# Patient Record
Sex: MALE | Race: WHITE | NOT HISPANIC OR LATINO | Employment: OTHER | URBAN - METROPOLITAN AREA
[De-identification: names, ages, dates, MRNs, and addresses within clinical notes are randomized per-mention and may not be internally consistent; named-entity substitution may affect disease eponyms.]

---

## 2019-11-02 ENCOUNTER — HOSPITAL ENCOUNTER (EMERGENCY)
Facility: MEDICAL CENTER | Age: 64
End: 2019-11-02
Attending: EMERGENCY MEDICINE
Payer: MEDICARE

## 2019-11-02 VITALS
WEIGHT: 177.03 LBS | OXYGEN SATURATION: 97 % | DIASTOLIC BLOOD PRESSURE: 77 MMHG | HEART RATE: 84 BPM | BODY MASS INDEX: 24.78 KG/M2 | HEIGHT: 71 IN | TEMPERATURE: 97.5 F | RESPIRATION RATE: 16 BRPM | SYSTOLIC BLOOD PRESSURE: 112 MMHG

## 2019-11-02 DIAGNOSIS — M25.562 ACUTE PAIN OF LEFT KNEE: ICD-10-CM

## 2019-11-02 PROCEDURE — 99283 EMERGENCY DEPT VISIT LOW MDM: CPT

## 2019-11-02 SDOH — HEALTH STABILITY: MENTAL HEALTH: HOW MANY STANDARD DRINKS CONTAINING ALCOHOL DO YOU HAVE ON A TYPICAL DAY?: 1 OR 2

## 2019-11-02 NOTE — ED TRIAGE NOTES
"PT to triage c/o lt knee pain since last night, per report pt squatted and heard a \"snap\" and has had knee pain since  Chief Complaint   Patient presents with   • Knee Pain     lt \"heard a snap\" last night      /77   Pulse 84   Temp 36.4 °C (97.5 °F) (Temporal)   Resp 16   Ht 1.803 m (5' 11\")   Wt 80.3 kg (177 lb 0.5 oz)   SpO2 97%     "

## 2019-11-02 NOTE — ED PROVIDER NOTES
"ED Provider Note    Scribed for Praful Villa M.D. by Chelo Wood. 11/2/2019  10:22 AM    Primary care provider: None   Means of arrival: walk in  History obtained from: patient  History limited by: none    CHIEF COMPLAINT  Chief Complaint   Patient presents with   • Knee Pain     lt \"heard a snap\" last night        HPI  Krystian Jimenez is a 64 y.o. male who presents to the Emergency Department for a left knee injury onset last night. He described that squatted down last night and when he stood up he heard a pop. He has had this happen before with all the same symptoms but the pain usually goes away after a few hours however it didn't resolve. Patient is visiting from [place] and notes he is returning on Monday. Denies numbness or tingling in the leg.    REVIEW OF SYSTEMS  Pertinent positives include left knee pain.   Pertinent negatives include no numbness or tingling .    All other systems reviewed and negative. See HPI for further details.       PAST MEDICAL HISTORY   none pertinent    SURGICAL HISTORY  patient denies any surgical history    SOCIAL HISTORY  Social History     Tobacco Use   • Smoking status: Never Smoker   • Smokeless tobacco: Never Used   Substance Use Topics   • Alcohol use: Yes     Drinks per session: 1 or 2   • Drug use: Never      Social History     Substance and Sexual Activity   Drug Use Never       FAMILY HISTORY  History reviewed. No pertinent family history.    CURRENT MEDICATIONS  Home Medications    **Home medications have not yet been reviewed for this encounter**         ALLERGIES  No Known Allergies    PHYSICAL EXAM  VITAL SIGNS: /77   Pulse 84   Temp 36.4 °C (97.5 °F) (Temporal)   Resp 16   Ht 1.803 m (5' 11\")   Wt 80.3 kg (177 lb 0.5 oz)   SpO2 97%   BMI 24.69 kg/m²     Nursing note and vitals reviewed.  Constitutional: Well-developed and well-nourished. No distress.   HENT: Head is normocephalic and atraumatic. Oropharynx is clear and moist without exudate " or erythema.   Eyes: Pupils are equal, round, and reactive to light. Conjunctiva are normal.   Cardiovascular: Normal rate and regular rhythm. No murmur heard. Normal radial pulses.  Pulmonary/Chest: Breath sounds normal. No wheezes or rales.   Abdominal: Soft and non-tender. No distention    Musculoskeletal: Extremities exhibit normal range of motion without edema. Tenderness along left medial joint line of left knee. No effusion.  Neurological: Awake, alert and oriented to person, place, and time. No focal deficits noted.  Skin: Skin is warm and dry. No rash.   Psychiatric: Normal mood and affect. Appropriate for clinical situation.      COURSE & MEDICAL DECISION MAKING  Nursing notes, VS, PMSFHx reviewed in chart.     10:22 AM - Patient seen and examined at bedside. I suspect that the he has a cartilage injury. Patient declines x-ray and will follow up in [place]. Today we will treat him with a knee immobilizer and crutches.  Advised the patient to ice his knee when moving around a lot and Ibuprofen for pain as needed. Additionally I recommended the patient stay off of his knee as much as possible and elevate it when siting or laying down. Patient will be discharged home. He verbalizes agreement to discharge and plan of care. The differential diagnoses include but are not limited to: cartilage injury.    The patient will return for new or worsening symptoms and is stable at the time of discharge.    DISPOSITION:  Patient will be discharged home in stable condition.    FOLLOW UP:  [facility], Emergency Dept  [facility] [id number] [phone]  Schedule an appointment as soon as possible for a visit       FINAL IMPRESSION  1. Acute pain of left knee          Chelo MUSTAFA), more scribing for, and in the presence of, Praful Villa M.D..    Electronically signed by: Chelo Acuña), 11/2/2019    Praful MUSTAFA M.D. personally performed the services  described in this documentation, as scribed by Chelo Wood in my presence, and it is both accurate and complete. E    The note accurately reflects work and decisions made by me.  Praful Villa  11/2/2019  11:15 AM

## 2020-01-22 ENCOUNTER — TRANSFERRED RECORDS (OUTPATIENT)
Dept: HEALTH INFORMATION MANAGEMENT | Facility: CLINIC | Age: 65
End: 2020-01-22

## 2020-07-22 ENCOUNTER — TRANSFERRED RECORDS (OUTPATIENT)
Dept: HEALTH INFORMATION MANAGEMENT | Facility: CLINIC | Age: 65
End: 2020-07-22

## 2020-07-31 ENCOUNTER — TELEPHONE (OUTPATIENT)
Dept: OPHTHALMOLOGY | Facility: CLINIC | Age: 65
End: 2020-07-31

## 2020-07-31 PROBLEM — H35.3132 INTERMEDIATE STAGE DRY AGE-RELATED MACULAR DEGENERATION OF BOTH EYES: Status: ACTIVE | Noted: 2020-07-31

## 2020-07-31 NOTE — TELEPHONE ENCOUNTER
Left voice message to schedule appointment per Dr. Quintero. Per notes, patient should see Dr. Mcnally to rule out inflammation.   Shine Meneses @ [facility] 9:27 AM July 31, 2020

## 2020-08-03 ENCOUNTER — OFFICE VISIT (OUTPATIENT)
Dept: OPHTHALMOLOGY | Facility: CLINIC | Age: 65
End: 2020-08-03
Attending: OPHTHALMOLOGY
Payer: MEDICARE

## 2020-08-03 DIAGNOSIS — H30.92 POSTERIOR UVEITIS, LEFT EYE: Primary | ICD-10-CM

## 2020-08-03 DIAGNOSIS — H35.352 CYSTOID MACULAR EDEMA, LEFT EYE: ICD-10-CM

## 2020-08-03 DIAGNOSIS — H35.3132 INTERMEDIATE STAGE DRY AGE-RELATED MACULAR DEGENERATION OF BOTH EYES: ICD-10-CM

## 2020-08-03 DIAGNOSIS — Z79.899 HIGH RISK MEDICATION USE: ICD-10-CM

## 2020-08-03 DIAGNOSIS — H40.052 OCULAR HYPERTENSION, LEFT EYE: ICD-10-CM

## 2020-08-03 PROCEDURE — 92242 FLUORESCEIN&ICG ANGIOGRAPHY: CPT | Mod: ZF | Performed by: OPHTHALMOLOGY

## 2020-08-03 PROCEDURE — 92134 CPTRZ OPH DX IMG PST SGM RTA: CPT | Mod: ZF | Performed by: OPHTHALMOLOGY

## 2020-08-03 PROCEDURE — G0463 HOSPITAL OUTPT CLINIC VISIT: HCPCS | Mod: 25

## 2020-08-03 RX ORDER — SIMVASTATIN 20 MG
20 TABLET ORAL DAILY
COMMUNITY
Start: 2019-10-18

## 2020-08-03 RX ORDER — DORZOLAMIDE HYDROCHLORIDE AND TIMOLOL MALEATE 20; 5 MG/ML; MG/ML
1 SOLUTION/ DROPS OPHTHALMIC 2 TIMES DAILY
COMMUNITY
Start: 2019-11-06 | End: 2020-09-28

## 2020-08-03 RX ORDER — LISINOPRIL/HYDROCHLOROTHIAZIDE 10-12.5 MG
1 TABLET ORAL DAILY
COMMUNITY
Start: 2019-10-18

## 2020-08-03 RX ORDER — TAMSULOSIN HYDROCHLORIDE 0.4 MG/1
0.4 CAPSULE ORAL DAILY
COMMUNITY
Start: 2020-07-16 | End: 2021-07-16

## 2020-08-03 ASSESSMENT — REFRACTION_WEARINGRX
OS_ADD: +2.25
OD_SPHERE: -5.50
OD_CYLINDER: +2.50
OS_CYLINDER: +1.75
OS_SPHERE: -6.25
SPECS_TYPE: PAL
OS_AXIS: 100
OD_ADD: +2.25
OD_AXIS: 001

## 2020-08-03 ASSESSMENT — TONOMETRY
OS_IOP_MMHG: 16
IOP_METHOD: TONOPEN
OD_IOP_MMHG: 17

## 2020-08-03 ASSESSMENT — EXTERNAL EXAM - RIGHT EYE: OD_EXAM: NORMAL

## 2020-08-03 ASSESSMENT — VISUAL ACUITY
METHOD: SNELLEN - LINEAR
CORRECTION_TYPE: GLASSES
OS_CC: 20/25
OD_CC: 20/15
OS_CC+: -2
OD_CC+: -1

## 2020-08-03 ASSESSMENT — CONF VISUAL FIELD
OS_NORMAL: 1
OD_NORMAL: 1

## 2020-08-03 ASSESSMENT — SLIT LAMP EXAM - LIDS
COMMENTS: MILD MGD
COMMENTS: MILD MGD

## 2020-08-03 ASSESSMENT — CUP TO DISC RATIO
OS_RATIO: 0.7
OD_RATIO: 0.5

## 2020-08-03 ASSESSMENT — EXTERNAL EXAM - LEFT EYE: OS_EXAM: NORMAL

## 2020-08-03 NOTE — LETTER
8/3/2020       RE: Lc Hernandez  [id number] [facility] [id number]     Dear Colleague,    Thank you for referring your patient, Lc Hernandez, to the EYE CLINIC at [facility]. Please see a copy of my visit note below.    Chief Complaint/Presenting Concern: Uveitis evaluation    History of Present Illness:   Lc Hernandez is a 65 year old patient who presents for evaluation of from Dr. Ankur Quintero.    History began around November 2019 at which time he saw an outside Optometrist who noted changes in the left eye and then referred the patient to Dr. Ankur Quintero. 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  Laboratory work-up at that time was reported as negative and include HLA-A 29.  The patient was started on oral prednisone 80 mg daily and had improvement in the uveitis as well as cystoid macular edema and this was tapered gradually and then tapered off by February 2020.    Mr. Hernandez saw a rheumatologist in March 2020 about steroid sparing therapy. There were discussions about possible Methotrexate and Mycophenolate but none were needed. Mr. Hernandez had an MRI brain May 2020 which did not show any signs of CNS lymphoma.    In July 2020 the patient presented to Dr. Ankur Quintero with new hyper autofluorescent spots nasally in the left eye while off oral prednisone and this consultation was recommended.    Overall, he does not feel things are very different in the left eye. He feels they are some blind spots in the left eye, but these don't seem to interfere with day to day life. No flashing lights or floaters. He does notice any changes in the right eye.    Additional Ocular History:   1.  Intermediate nonexudative macular degeneration of both eyes  2.  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  Also followed by Dr. Chuck Wade. Improved on drops, not steroid related.     Relevant Past Medical/Family/Social  History:  1. Hypertension  2. Hyperlipidemia  3. History of Prostate cancer treated in 2019. On Flomax    Numerous surgeries including hernia surgery in 2010, knee surgery in 2017, back surgery as well. Mother and maternal uncle with macular degeneration.    Relevant Review of Systems: No fevers, rashes, joint pains, no confusion or memory issues.     Laboratory Testing (reviewed)  1.July 2020: CBC within normal limits other than slight reduction of absolute neutrophil count (0.4)  2. March 2020: Serum protein electrophoresis normal.   3. November 2019: Treponemal antibodies negative, QuantiFERON gold negative, CBC with absolute neutrophil count slightly reduced at 0.4     Current eye related medications: AREDS 2 vitamins, Cosopt 2x/day left eye     Retina/Uveitis Imaging:  OCT Spectralis Macula August 3, 2020  right eye: Pseudodrusen, drusen, no fluid. Normal to slightly thin choroid.  left eye: ERM, no fluid. Pseudodrusen, central drusenoid PED, thin choroid.     Optos Fluorescein and ICG Angiography OU (both eyes) August 3, 2020   right eye: Hyper-fluroescent spots in nasal and temporal. Mild hyperfluorescent staining along periphery temporal, no abnormal ICG spots. Late disc staining. Mild hyperfluorescence of drusen in macula, no CME  left eye: Normal transit. Mild tortuosity of vessels in macula. No significant large or small leakage nor CME. Numerous nasal, superior, inferior hyper-fluorescent spots which have corresponding hypo-ICG spots.     Optos Photos and AF  right eye: Drusen, nasal mottling. Near normal AF  left eye: Drusen, nasal hypo-pigmented lesions numerous, some superior and inferior mid periphery which are Hyper-AF    Assessment:    1. Posterior uveitis, left eye  With choroidal lesions and previous noted to have cystoid macular edema and disc edema. There are hypo-pigmented spots in the left eye with corresponding hyper-fluorescent spots     2. Cystoid macular edema, left eye  None by OCT  today.     3. Intermediate stage dry age-related macular degeneration of both eyes  Mild and non-exudative in each eye.     4. Ocular hypertension, left eye  IOP 16 on Cosopt 2x/day. Per patient, likely similar VF testing.    5. High risk medication use  Prednisone previously tolerated up to 80 mg daily.     Plan/Recommendations:    Discussed findings with patient. Agree with Dr. 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  Other diagnostic possibilities include primary intraocular lymphoma or metastatic lesions.  However given the normal brain MRI in May 2020 and well treated and monitored prostate cancer, both of these seem less likely.    Another possibility is localized choroidal lymphoma.  This is distinct from systemic lymphoma as it is present only in the eye and tends to be stable without progression.  This can often be seen in patients who have such findings. Some differences include a lack of progression over time and no significant associated inflammatory changes.  As this patient did have cystoid macular edema and optic disc edema associated with these lesions, this condition is also in the differential diagnosis but also seems less likely    Recommend ACE, ANCA to complete work up for possible granulomatous changes in the choroid of the left eye.  There are no systemic symptoms to suggest Sarcoidosis nor GPA, but this would complete the work-up.     Continue Cosopt 2x/day in the left eye and AREDS 2 vitamins    Would not recommend oral prednisone nor initiating immunosuppression at this time given the absence of systemic inflammatory features.  Would recommend close monitoring with OCT and fundus autofluorescence, and will request visual field testing.  If there is progression of the autofluorescence with new spots, progressive visual field changes, cystoid macular edema, a course of oral prednisone may be recommended followed by bridge to steroid sparing  therapy which is usually mycophenolate in this condition.    Another consideration might be a full-field electroretinogram (ERG).  However as this assesses both eyes, it may be less helpful as the right eye appears normal.  This may be a consideration to help monitor in the future    RTC   1. Return for 4-6 weeks, IOP, Dilate, OCT (ordered), Optos Photos, AF.    2. Request VF testing from Dr. Quintero (will send copies of FA/OCG images)    Physician Attestation     Attending Physician Attestation:  Complete documentation of historical and exam elements from today's encounter can be found in the full encounter summary report (not reduplicated in this progress note). I personally obtained the chief complaint(s) and history of present illness. I confirmed and edited as necessary the review of systems, past medical/surgical history, family history, social history, and examination findings as documented by others; and I examined the patient myself. I personally reviewed the relevant tests, images, and reports as documented above. I formulated and edited as necessary the assessment and plan and discussed the findings and management plan with the patient and any family members present at the time of the visit.  Nba Mcnally M.D., Uveitis and Medical Retina, August 3, 2020     Sincerely,    Nba Mcnally MD  [facility] Dept of Ophthalmology  Uveitis and Medical Retina

## 2020-08-03 NOTE — NURSING NOTE
Chief Complaints and History of Present Illnesses   Patient presents with     Consult For     Chief Complaint(s) and History of Present Illness(es)     Consult For     Laterality: left eye    Associated symptoms: Negative for flashes, floaters and eye pain (does state the eyes are a little irritated from the mask)    Pain scale: 0/10              Comments     Referred by Dr. Quintero for possible birdshot chorioretinopathy of the left eye  Pt had not noted any changes in vision    Ocular meds:  Cosopt BID, left eye    DENNY Santos 9:05 AM August 3, 2020

## 2020-08-03 NOTE — PROGRESS NOTES
Chief Complaint/Presenting Concern: Uveitis evaluation    History of Present Illness:   Lc Hernandez is a 65 year old patient who presents for evaluation of from Dr. Ankur Quintero.    History began around November 2019 at which time he saw an outside Optometrist who noted changes in the left eye and then referred the patient to Dr. Anukr Quintero. 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  Laboratory work-up at that time was reported as negative and include HLA-A 29.  The patient was started on oral prednisone 80 mg daily and had improvement in the uveitis as well as cystoid macular edema and this was tapered gradually and then tapered off by February 2020.    Mr. Hernandez saw a Rheumatologist in March 2020 about steroid sparing therapy. There were discussions about possible Methotrexate and Mycophenolate but none were needed. Mr. Hernandez had an MRI brain May 2020 which did not show any signs of CNS lymphoma.    In July 2020 the patient presented to Dr. Ankur Quintero with new hyper autofluorescent spots nasally in the left eye while off oral prednisone and this consultation was recommended.    Overall, he does not feel things are very different in the left eye. He feels they are some blind spots in the left eye, but these don't seem to interfere with day to day life. No flashing lights or floaters. He does notice any changes in the right eye.    Additional Ocular History:   1.  Intermediate nonexudative macular degeneration of both eyes  2.  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  Also followed by Dr. Chuck Wade. Improved on drops, not steroid related.     Relevant Past Medical/Family/Social History:  1. Hypertension  2. Hyperlipidemia  3. History of Prostate cancer treated in 2019. On Flomax    Numerous surgeries including hernia surgery in 2010, knee surgery in 2017, back surgery as well. Mother and maternal uncle with macular degeneration.    Relevant  Review of Systems: No fevers, rashes, joint pains, no confusion or memory issues.     Laboratory Testing (reviewed)  1.July 2020: CBC within normal limits other than slight reduction of absolute neutrophil count (0.4)  2. March 2020: Serum protein electrophoresis normal.   3. November 2019: Treponemal antibodies negative, QuantiFERON gold negative, CBC with absolute neutrophil count slightly reduced at 0.4     Current eye related medications: AREDS 2 vitamins, Cosopt 2x/day left eye     Retina/Uveitis Imaging:  OCT Spectralis Macula August 3, 2020  right eye: Pseudodrusen, drusen, no fluid. Normal to slightly thin choroid.  left eye: ERM, no fluid. Pseudodrusen, central drusenoid PED, thin choroid.     Optos Fluorescein and ICG Angiography OU (both eyes) August 3, 2020   right eye: Hyper-fluroescent spots in nasal and temporal. Mild hyperfluorescent staining along periphery temporal, no abnormal ICG spots. Late disc staining. Mild hyperfluorescence of drusen in macula, no CME  left eye: Normal transit. Mild tortuosity of vessels in macula. No significant large or small leakage nor CME. Numerous nasal, superior, inferior hyper-fluorescent spots which have corresponding hypo-ICG spots.     Optos Photos and AF  right eye: Drusen, nasal mottling. Near normal AF  left eye: Drusen, nasal hypo-pigmented lesions numerous, some superior and inferior mid periphery which are Hyper-AF    Assessment:    1. Posterior uveitis, left eye  With choroidal lesions and previous noted to have cystoid macular edema and disc edema. There are hypo-pigmented spots in the left eye with corresponding hyper-fluorescent spots     2. Cystoid macular edema, left eye  None by OCT today.     3. Intermediate stage dry age-related macular degeneration of both eyes  Mild and non-exudative in each eye.     4. Ocular hypertension, left eye  IOP 16 on Cosopt 2x/day. Per patient, likely similar VF testing.    5. High risk medication use  Prednisone  previously tolerated up to 80 mg daily.     Plan/Recommendations:    Discussed findings with patient. Agree with Dr. 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  Other diagnostic possibilities include primary intraocular lymphoma or metastatic lesions.  However given the normal brain MRI in May 2020 and well treated and monitored prostate cancer, both of these seem less likely.    Another possibility is localized choroidal lymphoma.  This is distinct from systemic lymphoma as it is present only in the eye and tends to be stable without progression.  This can often be seen in patients who have such findings. Some differences include a lack of progression over time and no significant associated inflammatory changes.  As this patient did have cystoid macular edema and optic disc edema associated with these lesions, this condition is also in the differential diagnosis but also seems less likely    Recommend ACE, ANCA to complete work up for possible granulomatous changes in the choroid of the left eye.  There are no systemic symptoms to suggest Sarcoidosis nor GPA, but this would complete the work-up.     Continue Cosopt 2x/day in the left eye and AREDS 2 vitamins    Would not recommend oral prednisone nor initiating immunosuppression at this time given the absence of systemic inflammatory features.  Would recommend close monitoring with OCT and fundus autofluorescence, and will request visual field testing.  If there is progression of the autofluorescence with new spots, progressive visual field changes, cystoid macular edema, a course of oral prednisone may be recommended followed by bridge to steroid sparing therapy which is usually mycophenolate in this condition.    Another consideration might be a full-field electroretinogram (ERG).  However as this assesses both eyes, it may be less helpful as the right eye appears normal.  This may be a consideration to help monitor in  the future    RTC   1. Return for 4-6 weeks, IOP, Dilate, OCT (ordered), Optos Photos, AF.    2. Request VF testing from Dr. Quintero (will send copies of FA/OCG images)    Update August 25, 2020: Called patient with results. ANCA negative, ACE low (on lisinopril). No other testing recommended. As no change in symptoms, would not recommend any other treatment besides Cosopt twice a day in the left eye and AREDS vitamins.  Recommend returning in a few weeks as scheduled      Physician Attestation     Attending Physician Attestation:  Complete documentation of historical and exam elements from today's encounter can be found in the full encounter summary report (not reduplicated in this progress note). I personally obtained the chief complaint(s) and history of present illness. I confirmed and edited as necessary the review of systems, past medical/surgical history, family history, social history, and examination findings as documented by others; and I examined the patient myself. I personally reviewed the relevant tests, images, and reports as documented above. I formulated and edited as necessary the assessment and plan and discussed the findings and management plan with the patient and any family members present at the time of the visit.  Nba Mcnally M.D., Uveitis and Medical Retina, August 3, 2020

## 2020-08-21 ENCOUNTER — TRANSFERRED RECORDS (OUTPATIENT)
Dept: HEALTH INFORMATION MANAGEMENT | Facility: CLINIC | Age: 65
End: 2020-08-21

## 2020-09-14 ENCOUNTER — OFFICE VISIT (OUTPATIENT)
Dept: OPHTHALMOLOGY | Facility: CLINIC | Age: 65
End: 2020-09-14
Attending: OPHTHALMOLOGY
Payer: MEDICARE

## 2020-09-14 DIAGNOSIS — H40.052 OCULAR HYPERTENSION, LEFT EYE: ICD-10-CM

## 2020-09-14 DIAGNOSIS — H30.92 POSTERIOR UVEITIS, LEFT EYE: Primary | ICD-10-CM

## 2020-09-14 DIAGNOSIS — H35.052 CHOROIDAL RETINAL NEOVASCULARIZATION OF LEFT EYE: ICD-10-CM

## 2020-09-14 DIAGNOSIS — Z79.899 HIGH RISK MEDICATION USE: ICD-10-CM

## 2020-09-14 DIAGNOSIS — H35.3132 INTERMEDIATE STAGE DRY AGE-RELATED MACULAR DEGENERATION OF BOTH EYES: ICD-10-CM

## 2020-09-14 DIAGNOSIS — H35.352 CYSTOID MACULAR EDEMA, LEFT EYE: ICD-10-CM

## 2020-09-14 PROCEDURE — G0463 HOSPITAL OUTPT CLINIC VISIT: HCPCS | Mod: ZF

## 2020-09-14 PROCEDURE — 92134 CPTRZ OPH DX IMG PST SGM RTA: CPT | Mod: ZF | Performed by: OPHTHALMOLOGY

## 2020-09-14 PROCEDURE — 92250 FUNDUS PHOTOGRAPHY W/I&R: CPT | Mod: 59 | Performed by: OPHTHALMOLOGY

## 2020-09-14 RX ORDER — PREDNISONE 10 MG/1
TABLET ORAL
Qty: 30 TABLET | Refills: 1 | Status: SHIPPED | OUTPATIENT
Start: 2020-09-14 | End: 2020-09-28

## 2020-09-14 ASSESSMENT — CONF VISUAL FIELD
OS_NORMAL: 1
OD_NORMAL: 1
METHOD: COUNTING FINGERS

## 2020-09-14 ASSESSMENT — VISUAL ACUITY
OS_CC+: -2
CORRECTION_TYPE: GLASSES
OD_CC: 20/20
OD_CC+: -2
OS_CC: 20/25
METHOD: SNELLEN - LINEAR

## 2020-09-14 ASSESSMENT — REFRACTION_WEARINGRX
OD_CYLINDER: +2.50
OS_SPHERE: -6.25
SPECS_TYPE: PAL
OS_ADD: +2.25
OS_CYLINDER: +1.75
OD_AXIS: 001
OS_AXIS: 100
OD_ADD: +2.25
OD_SPHERE: -5.50

## 2020-09-14 ASSESSMENT — EXTERNAL EXAM - LEFT EYE: OS_EXAM: NORMAL

## 2020-09-14 ASSESSMENT — CUP TO DISC RATIO
OD_RATIO: 0.4
OS_RATIO: 0.8

## 2020-09-14 ASSESSMENT — TONOMETRY
OD_IOP_MMHG: 17
IOP_METHOD: TONOPEN
OS_IOP_MMHG: 13

## 2020-09-14 ASSESSMENT — SLIT LAMP EXAM - LIDS
COMMENTS: MILD MGD
COMMENTS: MILD MGD

## 2020-09-14 ASSESSMENT — EXTERNAL EXAM - RIGHT EYE: OD_EXAM: NORMAL

## 2020-09-14 NOTE — PATIENT INSTRUCTIONS
Continue Cosopt 2x/day left eye and AREDS Vitamins    Restart prednisone 20 mg daily. Here is some information about prednisone . We will see you back in 1-2 weeks. Check in the lines on the chart in the left eye and if things get blurrier/wavier, please call us for an appointment sooner.     Prednisone is a steroid pill. It can be used for many different conditions and usually for short periods of time (a few weeks to a few months), but some people may take it for years. Sometimes we might get blood tests before starting this medication, but not always.    Prednisone is often prescribed as 10 mg pills. It is usually easiest to take the whole dose (all the pills) with breakfast because it can minimize side effects. A very common side effect is stomach discomfort and some people take antacid medications to help with these symptoms. Other side effects which can occur include raising the blood pressure, blood sugar, and weight. It can also cause irritability and trouble sleeping. For these reasons, we will try to limit the amount of time that prednisone is taken.    Bone loss is one of the long term side effects which can occur on prednisone. If you have been on this medication for many months or many years, you should talk to your Primary Doctor about a Bone Density (DEXA) scan.

## 2020-09-14 NOTE — NURSING NOTE
Chief Complaints and History of Present Illnesses   Patient presents with     Follow Up     6 week follow up Posterior uveitis, left eye     Chief Complaint(s) and History of Present Illness(es)     Follow Up     Laterality: both eyes    Comments: 6 week follow up Posterior uveitis, left eye              Comments     Pt states vision is the same or better since last visit. No eye pain today.  No flashes or floaters No redness or dryness.    BRE Lozano September 14, 2020 10:40 AM

## 2020-09-14 NOTE — PROGRESS NOTES
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 Since last visit, he had lab testing which was negative for ACE and ANCA. He has not needed to restart prednisone and has not noted any worsening since then. No flashes, spots or other changes. The right eye is also asymptomatic     Interval Updates to Medical/Family/Social History:  No other health changes since last visit    Relevant Review of Systems Updates:  No recent illnesses    Laboratory Testing: ANCA negative, ACE low (on lisinopril).      Current eye related medications: Cosopt 2x/day left eye, AREDS Vitamins    Retina/Uveitis Imaging:   OCT Spectralis Macula September 14, 2020  right eye: Drusen, no fluid. Thin choroid. Stable.   left eye: Larger serous PED inferior and superior to fovea, but no bonifacio SRF or IRF. Normal choroidal thickness.    OCT-A with subtle abnormal flow signal in avascular outer retina left eye and irregular choriocapillaris flow left eye     Optos AF September 14, 2020  right eye Generally normal AF  left eye: Stable hyper-AF discrete rounds spots mostly superior, nasal, inferior     Optos Photos September 14, 2020  right eye: Mottling in macula  left eye: Cupping, Focal hypo-pigmented spots nasal, superior, inferior. Stable    Assessment:     1. Posterior uveitis, left eye  Exam unchanged. Optos Photos/Autofluorescence unchanged but new vitreous inflammation and macular changes by OCT as noted below.     2. Cystoid macular edema, left eye  No bonifacio intraretinal fluid but new pigment epithelial detachment with normal choroidal thickness    3. Intermediate stage dry age-related macular degeneration of both eyes  Mottling in each eye     4. Choroidal retinal neovascularization of left eye  New finding today with flow signal in avascular outer retina and choroid capillaritis of  the left eye    5. Ocular hypertension, left eye  Controlled on Cosopt 2x/day.    6. High risk medication use  Now off prednisone for the last 5 months    Plan/Recommendations:      Discussed findings with patient. Increased inflammation left eye with larger PED on OCT scan.  We discussed that these could be an early inflammatory CNV (less likely seen in [place] Chorioretinopathy) or manifestation of inflammatory cystoid macular edema (more common on [place])    OCT-A left eye showed abnormal flow signal in avascular outer retina and choriocapillaris, which supports achoroidal neovascular process in the left eye.  This could be secondary to inflammation or could be secondary to aging related changes (macular degeneration)    Do not recommend additiional diagnostic testing at this time    Continue Cosopt 2x/day left eye and AREDS Vitamins    We discussed a therapeutic trial of moderate dose of oral prednisone to determine if these macular changes in the left eye would improve as there is an increase in vitreous inflammation today.  We also discussed the possibility of an antibiotic injection called Avastin in the left eye.  We elected to proceed with a course of oral prednisone and reevaluate soon.    Restart prednisone 20 mg daily. If not better in 1-2 week, this might be secondary to AMD and not uveitis. If so, would recommend Avastin in the left eye.  This was discussed with the patient today and ordered in the event that it would be needed next time    RTC  1. Return 1-2 weeks IOP, dilate, OCT (ordered). Possible Avastin left eye    2. Dr. Eduardo Wade to follow up on Glaucoma later    Physician Attestation     Attending Physician Attestation:  Complete documentation of historical and exam elements from today's encounter can be found in the full encounter summary report (not reduplicated in this progress note). I personally obtained the chief complaint(s) and history of present illness. I confirmed and edited  as necessary the review of systems, past medical/surgical history, family history, social history, and examination findings as documented by others; and I examined the patient myself. I personally reviewed the relevant tests, images, and reports as documented above. I formulated and edited as necessary the assessment and plan and discussed the findings and management plan with the patient and family members present at the time of this visit.  Nba Mcnally M.D., Uveitis and Medical Retina, September 14, 2020

## 2020-09-28 ENCOUNTER — OFFICE VISIT (OUTPATIENT)
Dept: OPHTHALMOLOGY | Facility: CLINIC | Age: 65
End: 2020-09-28
Attending: OPHTHALMOLOGY
Payer: MEDICARE

## 2020-09-28 DIAGNOSIS — H40.052 OCULAR HYPERTENSION, LEFT EYE: ICD-10-CM

## 2020-09-28 DIAGNOSIS — H35.352 CYSTOID MACULAR EDEMA, LEFT EYE: ICD-10-CM

## 2020-09-28 DIAGNOSIS — H35.3132 INTERMEDIATE STAGE DRY AGE-RELATED MACULAR DEGENERATION OF BOTH EYES: ICD-10-CM

## 2020-09-28 DIAGNOSIS — Z79.899 HIGH RISK MEDICATION USE: ICD-10-CM

## 2020-09-28 DIAGNOSIS — H30.92 POSTERIOR UVEITIS, LEFT EYE: Primary | ICD-10-CM

## 2020-09-28 DIAGNOSIS — H35.052 CHOROIDAL RETINAL NEOVASCULARIZATION OF LEFT EYE: ICD-10-CM

## 2020-09-28 PROCEDURE — G0463 HOSPITAL OUTPT CLINIC VISIT: HCPCS | Mod: ZF

## 2020-09-28 PROCEDURE — 92134 CPTRZ OPH DX IMG PST SGM RTA: CPT | Mod: ZF | Performed by: OPHTHALMOLOGY

## 2020-09-28 RX ORDER — DORZOLAMIDE HYDROCHLORIDE AND TIMOLOL MALEATE 20; 5 MG/ML; MG/ML
1 SOLUTION/ DROPS OPHTHALMIC 2 TIMES DAILY
Qty: 10 ML | Refills: 6 | Status: SHIPPED | OUTPATIENT
Start: 2020-09-28 | End: 2021-01-27

## 2020-09-28 RX ORDER — PREDNISONE 10 MG/1
TABLET ORAL
Qty: 2 TABLET | Refills: 0
Start: 2020-09-28 | End: 2020-10-28

## 2020-09-28 RX ORDER — DIAZEPAM 5 MG
5 TABLET ORAL ONCE
Qty: 1 TABLET | Refills: 0 | Status: SHIPPED | OUTPATIENT
Start: 2020-09-28 | End: 2020-09-28

## 2020-09-28 ASSESSMENT — CUP TO DISC RATIO
OS_RATIO: 0.8
OD_RATIO: 0.4

## 2020-09-28 ASSESSMENT — REFRACTION_WEARINGRX
OD_SPHERE: -5.50
OS_ADD: +2.25
OD_ADD: +2.25
OS_SPHERE: -6.25
OS_AXIS: 100
SPECS_TYPE: PAL
OD_AXIS: 001
OD_CYLINDER: +2.50
OS_CYLINDER: +1.75

## 2020-09-28 ASSESSMENT — CONF VISUAL FIELD
OS_NORMAL: 1
METHOD: COUNTING FINGERS
OD_NORMAL: 1

## 2020-09-28 ASSESSMENT — VISUAL ACUITY
OS_PH_CC: 20/30
METHOD: SNELLEN - LINEAR
OS_CC: 20/40
OD_CC: 20/20
CORRECTION_TYPE: GLASSES
OS_CC+: +1

## 2020-09-28 ASSESSMENT — EXTERNAL EXAM - RIGHT EYE: OD_EXAM: NORMAL

## 2020-09-28 ASSESSMENT — SLIT LAMP EXAM - LIDS
COMMENTS: MILD MGD
COMMENTS: MILD MGD

## 2020-09-28 ASSESSMENT — TONOMETRY
OS_IOP_MMHG: 17
IOP_METHOD: TONOPEN
OD_IOP_MMHG: 17

## 2020-09-28 ASSESSMENT — EXTERNAL EXAM - LEFT EYE: OS_EXAM: NORMAL

## 2020-09-28 NOTE — NURSING NOTE
Chief Complaints and History of Present Illnesses   Patient presents with     Uveitis Follow-Up     Chief Complaint(s) and History of Present Illness(es)     Uveitis Follow-Up     Laterality: left eye    Onset: weeks ago    Quality: States va is the same since last visit      Associated symptoms: Negative for flashes, floaters and photophobia    Pain scale: 0/10              Comments     Here for Posterior uveitis, left eye   Prednisone 20 mg daily will discontinue today  Dorz/Timolol BID CARLY Zapata COT 10:00 AM September 28, 2020

## 2020-09-28 NOTE — PATIENT INSTRUCTIONS
Continue Cosopt 2x/day in the left eye. Let's continue this until 1 week before the next visit    Continue the eye vitamins    Reduce the prednisone to one pill 10 mg daily for the next two days then stop    Monitor the vision daily in the left eye at least daily. If there any changes, please call us for an appointment that day or the next day. You could also see one of the Retina Doctors there, Dr. Mera Reyes.     When you come for the next visit,  the valium prescription to be used if we need to do an injection of Avastin.

## 2020-09-28 NOTE — LETTER
9/28/2020       RE: Lc Hernandez  [id number] [facility] [id number]     Dear Colleague,    Thank you for referring your patient, Lc Hernandez, to the EYE CLINIC at [facility]. Please see a copy of my visit note below.    Chief Complaint/Presenting Concern:  Uveitis follow up    Interval History of Present Ocular Illness: Lc Hernandez is a 65 year old patient who returns for follow up of the posterior uveitis and CME/CNV of the left eye. At last visit, we identified a change on the OCT scan of the left eye and discussed Avastin vs prednisone (for possible inflammatory CNV) and elected to start prednisone. In the interim, no changes per Mr. Hernandez. Prednisone 20 mg daily well tolerated.     Interval Updates to Medical/Family/Social History:  No new medications    Relevant Review of Systems Updates: No recent health changes, no issues with predinsone    Laboratory Testing: None     Current eye related medications: Cosopt 2x/day left eye, AREDS Vitamins, Prednisone 20 mg daily.    Retina/Uveitis Imaging:   OCT Spectralis Macula September 28, 2020  right eye: Drusen, no fluid. Stable  left eye: Bi-lobed PEDs which are both increased in size    Assessment:   1. Posterior uveitis, left eye  Improved inflammation on prednisone.    2. Cystoid macular edema, left eye  No true CME    3. Choroidal retinal neovascularization of left eye  Slightly worse by OCT    4. Intermediate stage dry age-related macular degeneration of both eyes  Right eye stable, left eye as above    5. Ocular hypertension, left eye  On Cosopt 2x/day with cupping in the left eye     6. High risk medication use  Prednisone 20 mg daily    Plan/Recommendations:      Discussed findings with patient. The inflammation is better but the PED worse.  This suggests that this is not an inflammatory choroidal neovascular process in the left eye but may be related more to the underlying macular degeneration.  We discussed  the Avastin injection in the left to help stabilize the process but that likely this will need to be repeated on a monthly basis and then the interval gradually extended, but monitored indefinitely.  We discussed the patient's health and his wife's health and the need for such routine follow-up may be difficult.      We discussed that the worse case scenario would include enlargement of the blurry spot in the left eye, but that this should not affect the peripheral vision.  Given the scenario including some hesitation about eye injections, we elected to monitor at home with Amsler grid and return in 2 months or sooner if vision changes are identified    Continue Cosopt 2x/day in the left eye. Let's continue this until 1 week before the next visit    Continue the eye vitamins.  We will plan on obtaining an optic nerve scan in the left eye at the next visit to determine if long-term use of Cosopt is necessary    Reduce the prednisone to one pill 10 mg daily for the next two days then stop    Monitor the vision daily in the left eye at least daily. If there any changes, please call us for an appointment that day or the next day. You could also see one of the Retina Doctors there, Dr. Mera Reyes.     When you come for the next visit,  the valium prescription to be used if we need to do an injection of Avastin.     RTC 2 months IOP, dilate, OCT, RNFL (both ordered)     Attending Physician Attestation:  Complete documentation of historical and exam elements from today's encounter can be found in the full encounter summary report (not reduplicated in this progress note). I personally obtained the chief complaint(s) and history of present illness. I confirmed and edited as necessary the review of systems, past medical/surgical history, family history, social history, and examination findings as documented by others; and I examined the patient myself. I personally reviewed the relevant tests, images, and reports  as documented above. I formulated and edited as necessary the assessment and plan and discussed the findings and management plan with the patient and family members present at the time of this visit.  Nba Mcnally M.D., Uveitis and Medical Retina, September 28, 2020     Again, thank you for allowing me to participate in the care of your patient.      Sincerely,    Nba Mcnally MD  [facility] Dept of Ophthalmology  Uveitis and Medical Retina

## 2020-09-28 NOTE — PROGRESS NOTES
Chief Complaint/Presenting Concern:  Uveitis follow up    Interval History of Present Ocular Illness:  Lc Hernandez is a 65 year old patient who returns for follow up of the posterior uveitis and CME/CNV of the left eye. At last visit, we identified a change on the OCT scan of the left eye and discussed Avastin vs prednisone (for possible inflammatory CNV) and elected to start prednisone. In the interim, no changes per Mr. Hernandez. Prednisone 20 mg daily well tolerated.     Interval Updates to Medical/Family/Social History:  No new medications    Relevant Review of Systems Updates: No recent health changes, no issues with predinsone    Laboratory Testing: None     Current eye related medications: Cosopt 2x/day left eye, AREDS Vitamins, Prednisone 20 mg daily.    Retina/Uveitis Imaging:   OCT Spectralis Macula September 28, 2020  right eye: Drusen, no fluid. Stable  left eye: Bi-lobed PEDs which are both increased in size    Assessment:     1. Posterior uveitis, left eye  Improved inflammation on prednisone.    2. Cystoid macular edema, left eye  No true CME    3. Choroidal retinal neovascularization of left eye  Slightly worse by OCT    4. Intermediate stage dry age-related macular degeneration of both eyes  Right eye stable, left eye as above with likely exudative changes from underling AMD.     5. Ocular hypertension, left eye  On Cosopt 2x/day with cupping in the left eye     6. High risk medication use  Prednisone 20 mg daily    Plan/Recommendations:      Discussed findings with patient. The inflammation is better but the PED worse.  This suggests that this is not an inflammatory choroidal neovascular process in the left eye but may be related more to the underlying macular degeneration.  We discussed the Avastin injection in the left to help stabilize the process but that likely this will need to be repeated on a monthly basis and then the interval gradually extended, but monitored indefinitely.  We discussed the  patient's health and his wife's health and the need for such routine follow-up may be difficult.      We discussed that the worse case scenario would include enlargement of the blurry spot in the left eye, but that this should not affect the peripheral vision.  Given the scenario including some hesitation about eye injections, we elected to monitor at home with Amsler grid and return in 2 months or sooner if vision changes are identified    Continue Cosopt 2x/day in the left eye. Let's continue this until 1 week before the next visit    Continue the eye vitamins.  We will plan on obtaining an optic nerve scan in the left eye at the next visit to determine if long-term use of Cosopt is necessary    Reduce the prednisone to one pill 10 mg daily for the next two days then stop    Monitor the vision daily in the left eye at least daily. If there any changes, please call us for an appointment that day or the next day. You could also see one of the Retina Doctors there, Dr. Mera Reyes.     When you come for the next visit,  the valium prescription to be used if we need to do an injection of Avastin.     RTC 2 months IOP, dilate, OCT, RNFL (both ordered)     Physician Attestation     Attending Physician Attestation:  Complete documentation of historical and exam elements from today's encounter can be found in the full encounter summary report (not reduplicated in this progress note). I personally obtained the chief complaint(s) and history of present illness. I confirmed and edited as necessary the review of systems, past medical/surgical history, family history, social history, and examination findings as documented by others; and I examined the patient myself. I personally reviewed the relevant tests, images, and reports as documented above. I formulated and edited as necessary the assessment and plan and discussed the findings and management plan with the patient and family members present at the time of  this visit.  Nba Mcnally M.D., Uveitis and Medical Retina, September 28, 2020

## 2020-10-21 ENCOUNTER — TELEPHONE (OUTPATIENT)
Dept: OPHTHALMOLOGY | Facility: CLINIC | Age: 65
End: 2020-10-21

## 2020-10-21 NOTE — TELEPHONE ENCOUNTER
Reviewed with pt to have Valium on hand and  in case needed at exam    Pt verbally demonstrated understanding    Nilay Gross RN 2:25 PM 10/21/20

## 2020-10-21 NOTE — TELEPHONE ENCOUNTER
Thanks for the note.  Glad we could move up the appointment. If possible to relay to him to bring valium in case needed, that would be great. Thank you!

## 2020-10-21 NOTE — TELEPHONE ENCOUNTER
Pt has changes with vision more recently  Takes time to adapt after looking down reading, then looking up to read.  Maybe more difficulties overall with reading    No new redness  No photophobia  No eye pain  No new floater and no new flashing    Pt moved appt up to next week Wednesday and will stop cosopt  tomorrow til visit    Reviewed ok unless hears back from me today after I review/confirm with Dr. Dali Gross, RN 12:54 PM 10/21/20        [facility]    Phone Message    May a detailed message be left on voicemail: yes     Reason for Call: Other: Pt had an appt. with Dali on 12/7/20 and was told to call and get in earlier if Pt noticed any changes in eye.  Pt called and rescheduled the December appt for 10/28/20 and states he was told to not use his eye drops for a week prior to appt.  Pt is wondering if there are any other instructions that he should follow besides not using the eye drops.  Pt would like a call back.       Action Taken: Message routed to:  [facility] EYE    Travel Screening: Not Applicable

## 2020-10-28 ENCOUNTER — OFFICE VISIT (OUTPATIENT)
Dept: OPHTHALMOLOGY | Facility: CLINIC | Age: 65
End: 2020-10-28
Attending: OPHTHALMOLOGY
Payer: MEDICARE

## 2020-10-28 DIAGNOSIS — H40.052 OCULAR HYPERTENSION, LEFT EYE: ICD-10-CM

## 2020-10-28 DIAGNOSIS — H35.3132 INTERMEDIATE STAGE DRY AGE-RELATED MACULAR DEGENERATION OF BOTH EYES: ICD-10-CM

## 2020-10-28 DIAGNOSIS — H35.352 CYSTOID MACULAR EDEMA, LEFT EYE: ICD-10-CM

## 2020-10-28 DIAGNOSIS — H30.92 POSTERIOR UVEITIS, LEFT EYE: Primary | ICD-10-CM

## 2020-10-28 DIAGNOSIS — H35.052 CHOROIDAL RETINAL NEOVASCULARIZATION OF LEFT EYE: ICD-10-CM

## 2020-10-28 PROCEDURE — 92133 CPTRZD OPH DX IMG PST SGM ON: CPT | Performed by: OPHTHALMOLOGY

## 2020-10-28 PROCEDURE — 250N000011 HC RX IP 250 OP 636: Performed by: OPHTHALMOLOGY

## 2020-10-28 PROCEDURE — 67028 INJECTION EYE DRUG: CPT | Mod: LT | Performed by: OPHTHALMOLOGY

## 2020-10-28 PROCEDURE — 92134 CPTRZ OPH DX IMG PST SGM RTA: CPT | Performed by: OPHTHALMOLOGY

## 2020-10-28 PROCEDURE — G0463 HOSPITAL OUTPT CLINIC VISIT: HCPCS | Mod: 25

## 2020-10-28 RX ORDER — DIAZEPAM 5 MG
5 TABLET ORAL ONCE
Qty: 1 TABLET | Refills: 1 | Status: SHIPPED | OUTPATIENT
Start: 2020-10-28 | End: 2020-10-28

## 2020-10-28 RX ADMIN — Medication 1.25 MG: at 12:03

## 2020-10-28 ASSESSMENT — VISUAL ACUITY
OS_CC+: +1
OD_CC+: -1
CORRECTION_TYPE: GLASSES
OD_CC: 20/20
OS_CC: 20/50
METHOD: SNELLEN - LINEAR

## 2020-10-28 ASSESSMENT — REFRACTION_WEARINGRX
OD_SPHERE: -5.50
SPECS_TYPE: PAL
OS_SPHERE: -6.25
OS_AXIS: 100
OS_ADD: +2.25
OD_AXIS: 001
OS_CYLINDER: +1.75
OD_ADD: +2.25
OD_CYLINDER: +2.50

## 2020-10-28 ASSESSMENT — CUP TO DISC RATIO
OS_RATIO: 0.8
OD_RATIO: 0.4

## 2020-10-28 ASSESSMENT — TONOMETRY
OD_IOP_MMHG: 21
OS_IOP_MMHG: 23
IOP_METHOD: TONOPEN

## 2020-10-28 ASSESSMENT — SLIT LAMP EXAM - LIDS
COMMENTS: MILD MGD
COMMENTS: MILD MGD

## 2020-10-28 ASSESSMENT — CONF VISUAL FIELD
METHOD: COUNTING FINGERS
OD_NORMAL: 1
OS_NORMAL: 1

## 2020-10-28 ASSESSMENT — EXTERNAL EXAM - LEFT EYE: OS_EXAM: NORMAL

## 2020-10-28 ASSESSMENT — EXTERNAL EXAM - RIGHT EYE: OD_EXAM: NORMAL

## 2020-10-28 NOTE — PROGRESS NOTES
Chief Complaint/Presenting Concern:  Retina follow up    Interval History of Present Ocular Illness:  Lc Hernandez is a 65 year old patient who returns for follow up of his posterior uveitis and cystoid macular edema of the left eye. He was last seen a few weeks ago with minimal change in inflammation after another round of prednisone, but worsening retinal fluid. We discussed an Avastin injection versus observation and elected to consider an injection if things got worse.    In in the interim, Mr. Hernandez reports worsening vision in the left eye. He stopped the Cosopt in the left eye one week ago. He stopped the oral prednisone a few weeks after the last visit.     Interval Updates to Medical/Family/Social History:  No other health changes. Had Physical a few days ago and things do well.     Relevant Review of Systems Updates:  No recent illnesses    Laboratory Testing October 19, 2020: Normal A1c     Current eye related medications: No eye drops, no prednisone,     Retina/Uveitis Imaging:   OCT Spectralis Macula October 28, 2020  right eye: Drusenoid PEDs, no fluid. Stable.  left eye: Drusen,serous PED increased vs last visit.     OCT Optic Nerve RNFL Spectralis October 28, 2020  right eye: Avg thickness 81 microns, borderline thin sup nasal and temp  left eye: Avg thickness 45 microns, thin sup and inf    Assessment:     1. Posterior uveitis, left eye  Minimal vitreous inflammation, which suggests the prednisone course helped. CNV as below    2. Choroidal retinal neovascularization of left eye  Worsening pigment epithelial detachment most likely related to AMD versus uveitis.    3. Cystoid macular edema, left eye  No bonifacio CME, but PED as above secondary to AMD    4. Intermediate stage dry age-related macular degeneration of both eyes  Drusenoid changes in the right eye, CNV changes above in the left eye     5. Ocular hypertension, left eye  Elevated off Cosopt for one week with thinning on the RNFL scan of the  optic nerve.    Plan/Recommendations:      Discussed findings with patient.  The vision is slightly reduced in the left eye and the OCT shows an increase-of the pigment epithelial detachment as a manifestation of a choroidal neovascular membrane.  As the oral prednisone did not seem to improve this PED, this is most likely a manifestation of wet macular degeneration rather than an inflammatory CNV    As discussed last visit, we recommend an Avastin injection in the left eye.  We reviewed off label use and that it has a 90% chance of keeping the vision stable and 40% chance of improving the vision. We also discussed the need for monthly injections initially with likely subsequent extension of the interval between injections. We discussed that there are alternative medications which are more expensive but could be considered in the event that this is insufficient.  We discussed risks, benefits, and alternatives to the procedure as well as postprocedure precautions.  Informed consent was obtained to proceed with Avastin in the left eye     The eye pressure is elevated off Cosopt for one week in the left eye and the optic nerve is thinning. We discussed that this is very likely glaucoma in the left eye and drops should be used regularly perhaps indefinitely. Regarding the right eye as eye pressure borderline and optic nerve scan with only subtle abnormality, recommend observation of eye pressure in the right eye without drops  No oral prednisone indicated at this time    Continue AREDS2 vitamins and restart the Cosopt drop 2x/day in the left eye only    RTC 4-5 weeks IOP, dilate, OCT (ordered) possible Avastin left eye. Discuss timing of visual field testing and Glaucoma evaluation    Physician Attestation     Attending Physician Attestation:  Complete documentation of historical and exam elements from today's encounter can be found in the full encounter summary report (not reduplicated in this progress note). I  personally obtained the chief complaint(s) and history of present illness. I confirmed and edited as necessary the review of systems, past medical/surgical history, family history, social history, and examination findings as documented by others; and I examined the patient myself. I personally reviewed the relevant tests, images, and reports as documented above. I formulated and edited as necessary the assessment and plan and discussed the findings and management plan with the patient and family members present at the time of this visit.  Nba Mcnally M.D., Uveitis and Medical Retina, October 28, 2020

## 2020-10-28 NOTE — LETTER
10/28/2020       RE: Lc Hernandez  [address] [facility] [address]     Dear Colleague,    Thank you for referring your patient, Lc Hernandez, to the [facility] at [facility]. Please see a copy of my visit note below.    Chief Complaint/Presenting Concern:  Retina follow up    Interval History of Present Ocular Illness:  Lc Hernandez is a 65 year old patient who returns for follow up of his posterior uveitis and cystoid macular edema of the left eye. He was last seen a few weeks ago with minimal change in inflammation after another round of prednisone, but worsening retinal fluid. We discussed an Avastin injection versus observation and elected to consider an injection if things got worse.    In in the interim, Mr. Hernandez reports worsening vision in the left eye. He stopped the Cosopt in the left eye one week ago. He stopped the oral prednisone a few weeks after the last visit.     Interval Updates to Medical/Family/Social History:  No other health changes. Had Physical a few days ago and things do well.     Relevant Review of Systems Updates:  No recent illnesses    Laboratory Testing October 19, 2020: Normal A1c     Current eye related medications: No eye drops, no prednisone,     Retina/Uveitis Imaging:   OCT Spectralis Macula October 28, 2020  right eye: Drusenoid PEDs, no fluid. Stable.  left eye: Drusen,serous PED increased vs last visit.     OCT Optic Nerve RNFL Spectralis October 28, 2020  right eye: Avg thickness 81 microns, borderline thin sup nasal and temp  left eye: Avg thickness 45 microns, thin sup and inf    Assessment:     1. Posterior uveitis, left eye  Minimal vitreous inflammation, which suggests the prednisone course helped. CNV as below    2. Choroidal retinal neovascularization of left eye  Worsening pigment epithelial detachment most likely related to AMD versus uveitis.    3. Cystoid macular edema, left eye  No bonifacio CME,  but PED as above secondary to AMD    4. Intermediate stage dry age-related macular degeneration of both eyes  Drusenoid changes in the right eye, CNV changes above in the left eye     5. Ocular hypertension, left eye  Elevated off Cosopt for one week with thinning on the RNFL scan of the optic nerve.    Plan/Recommendations:      Discussed findings with patient.  The vision is slightly reduced in the left eye and the OCT shows an increase-of the pigment epithelial detachment as a manifestation of a choroidal neovascular membrane.  As the oral prednisone did not seem to improve this PED, this is most likely a manifestation of wet macular degeneration rather than an inflammatory CNV    As discussed last visit, we recommend an Avastin injection in the left eye.  We reviewed off label use and that it has a 90% chance of keeping the vision stable and 40% chance of improving the vision. We also discussed the need for monthly injections initially with likely subsequent extension of the interval between injections. We discussed that there are alternative medications which are more expensive but could be considered in the event that this is insufficient.  We discussed risks, benefits, and alternatives to the procedure as well as postprocedure precautions.  Informed consent was obtained to proceed with Avastin in the left eye     The eye pressure is elevated off Cosopt for one week in the left eye and the optic nerve is thinning. We discussed that this is very likely glaucoma in the left eye and drops should be used regularly perhaps indefinitely. Regarding the right eye as eye pressure borderline and optic nerve scan with only subtle abnormality, recommend observation of eye pressure in the right eye without drops  No oral prednisone indicated at this time    Continue AREDS2 vitamins and restart the Cosopt drop 2x/day in the left eye only    RTC 4-5 weeks IOP, dilate, OCT (ordered) possible Avastin left eye. Discuss timing  of visual field testing and Glaucoma evaluation    Attending Physician Attestation:  Complete documentation of historical and exam elements from today's encounter can be found in the full encounter summary report (not reduplicated in this progress note). I personally obtained the chief complaint(s) and history of present illness. I confirmed and edited as necessary the review of systems, past medical/surgical history, family history, social history, and examination findings as documented by others; and I examined the patient myself. I personally reviewed the relevant tests, images, and reports as documented above. I formulated and edited as necessary the assessment and plan and discussed the findings and management plan with the patient and family members present at the time of this visit.  Nba Mcnally M.D., Uveitis and Medical Retina, October 28, 2020     Nba Mcnally MD  [facility] Dept of Ophthalmology  Uveitis and Medical Retina

## 2020-10-28 NOTE — NURSING NOTE
Chief Complaints and History of Present Illnesses   Patient presents with     Uveitis Follow-Up     Chief Complaint(s) and History of Present Illness(es)     Uveitis Follow-Up     Laterality: left eye    Onset: months ago    Quality: Last Thursday the va was a bad day    Associated symptoms: Negative for floaters, flashes and photophobia    Treatments tried: no treatments    Pain scale: 0/10              Comments     Here for Posterior uveitis in the LE  Discontinue all gtts and pred  Rand Zapata COT 10:16 AM October 28, 2020

## 2020-11-30 ENCOUNTER — OFFICE VISIT (OUTPATIENT)
Dept: OPHTHALMOLOGY | Facility: CLINIC | Age: 65
End: 2020-11-30
Attending: OPHTHALMOLOGY
Payer: MEDICARE

## 2020-11-30 DIAGNOSIS — H30.92 POSTERIOR UVEITIS, LEFT EYE: Primary | ICD-10-CM

## 2020-11-30 DIAGNOSIS — H35.052 CHOROIDAL RETINAL NEOVASCULARIZATION OF LEFT EYE: ICD-10-CM

## 2020-11-30 DIAGNOSIS — H40.052 OCULAR HYPERTENSION, LEFT EYE: ICD-10-CM

## 2020-11-30 DIAGNOSIS — H35.3132 INTERMEDIATE STAGE DRY AGE-RELATED MACULAR DEGENERATION OF BOTH EYES: ICD-10-CM

## 2020-11-30 DIAGNOSIS — H35.352 CYSTOID MACULAR EDEMA, LEFT EYE: ICD-10-CM

## 2020-11-30 PROCEDURE — G0463 HOSPITAL OUTPT CLINIC VISIT: HCPCS | Mod: 25

## 2020-11-30 PROCEDURE — 250N000011 HC RX IP 250 OP 636: Performed by: OPHTHALMOLOGY

## 2020-11-30 PROCEDURE — 67028 INJECTION EYE DRUG: CPT | Mod: LT | Performed by: OPHTHALMOLOGY

## 2020-11-30 PROCEDURE — 92134 CPTRZ OPH DX IMG PST SGM RTA: CPT | Performed by: OPHTHALMOLOGY

## 2020-11-30 RX ORDER — DIAZEPAM 5 MG
5 TABLET ORAL ONCE
Qty: 1 TABLET | Refills: 1 | Status: SHIPPED | OUTPATIENT
Start: 2020-11-30 | End: 2020-11-30

## 2020-11-30 RX ADMIN — Medication 1.25 MG: at 13:32

## 2020-11-30 ASSESSMENT — REFRACTION_WEARINGRX
OD_SPHERE: -5.50
OD_ADD: +2.25
OS_AXIS: 100
OS_ADD: +2.25
OS_SPHERE: -6.25
SPECS_TYPE: PAL
OD_AXIS: 001
OD_CYLINDER: +2.50
OS_CYLINDER: +1.75

## 2020-11-30 ASSESSMENT — VISUAL ACUITY
CORRECTION_TYPE: GLASSES
OD_CC: 20/20
OS_CC+: -2
OS_CC: 20/30 SLOW
METHOD: SNELLEN - LINEAR

## 2020-11-30 ASSESSMENT — CONF VISUAL FIELD
OS_NORMAL: 1
METHOD: COUNTING FINGERS

## 2020-11-30 ASSESSMENT — CUP TO DISC RATIO
OD_RATIO: 0.4
OS_RATIO: 0.8

## 2020-11-30 ASSESSMENT — EXTERNAL EXAM - LEFT EYE: OS_EXAM: NORMAL

## 2020-11-30 ASSESSMENT — TONOMETRY
IOP_METHOD: TONOPEN
OS_IOP_MMHG: 18
OD_IOP_MMHG: 17

## 2020-11-30 ASSESSMENT — SLIT LAMP EXAM - LIDS
COMMENTS: MILD MGD
COMMENTS: MILD MGD

## 2020-11-30 ASSESSMENT — EXTERNAL EXAM - RIGHT EYE: OD_EXAM: NORMAL

## 2020-11-30 NOTE — NURSING NOTE
Chief Complaints and History of Present Illnesses   Patient presents with     Uveitis Follow-Up     Chief Complaint(s) and History of Present Illness(es)     Uveitis Follow-Up     Laterality: left eye    Onset: months ago    Quality: Feels that the va is better      Associated symptoms: Negative for floaters, flashes and photophobia    Treatments tried: eye drops    Pain scale: 0/10              Comments     Here for Posterior uveitis, left eye   Cosopt BID CARLY Zapata COT 12:27 PM November 30, 2020

## 2020-11-30 NOTE — PATIENT INSTRUCTIONS
You did great with the Injection in the left eye.     Continue vitamins and drop in the left eye. Okay to stop the drop in the left eye 3 days before next visit.    Have a nice Sandy!

## 2020-11-30 NOTE — LETTER
11/30/2020       RE: Lc Hernandez  [address] [facility] [address]     Dear Colleague,    Thank you for referring your patient, Lc Hernandez, to the [facility] EYE [facility] at [facility]. Please see a copy of my visit note below.    Chief Complaint/Presenting Concern:  Uveitis-Retina follow up    Interval History of Present Ocular Illness:  Lc Hernandez is a 65 year old patient who returns for follow up of his posterior uveitis and choroidal neovascularization of the left eye. At last visit, we did an Avastin injection in the left eye. This was well tolerated     Interval Updates to Medical/Family/Social History:  No new health changes    Relevant Review of Systems Updates:  No coughs/colds, no headaches.    Laboratory Testing: None since last visit      Current eye related medications: AREDS vitamins. Cosopt 2x/day in the left eye     Retina/Uveitis Imaging:   OCT Spectralis Macula November 30, 2020  right eye: Drusenoid changes, no fluid. Stable  left eye: Improving superior smaller PED, stable larger PED.    Assessment:     1. Posterior uveitis, left eye  Stable mild inflammation     2. Choroidal retinal neovascularization of left eye  Improves after Avastin in the left eye.     3. Cystoid macular edema, left eye  No bonifacio CME    4. Intermediate stage dry age-related macular degeneration of both eyes  Stable drusenoid changes in the right eye without exudation. Improved in the left eye after Avastin    5. Ocular hypertension, left eye  Improved on Cosopt 2x/day.     Plan/Recommendations:      Discussed findings with patient. The inflammation is mild and the choroidal neovascularization has improved after the Avastin in the left eye. Recommend Avastin in again in the left eye to continue treating the abnormal vessels. This was performed without complication    The inflammation has not recurred off prednisone, so this does not need to be restarted    No  additional testing recommended    Continue Cosopt 2x/day in the left eye until 3 days before the next visit. If the pressure is up again next visit, we might then continue Cosopt indefinitely    RTC 4 weeks IOP, dilate, OCT, Avastin left eye      Physician Attestation     Attending Physician Attestation:  Complete documentation of historical and exam elements from today's encounter can be found in the full encounter summary report (not reduplicated in this progress note). I personally obtained the chief complaint(s) and history of present illness. I confirmed and edited as necessary the review of systems, past medical/surgical history, family history, social history, and examination findings as documented by others; and I examined the patient myself. I personally reviewed the relevant tests, images, and reports as documented above. I formulated and edited as necessary the assessment and plan and discussed the findings and management plan with the patient and family members present at the time of this visit.  Nba Mcnally M.D., Uveitis and Medical Retina, November 30, 2020     Nba Mcnally MD  [facility] Dept of Ophthalmology  Uveitis and Medical Retina

## 2020-11-30 NOTE — PROGRESS NOTES
Chief Complaint/Presenting Concern:  Uveitis-Retina follow up    Interval History of Present Ocular Illness:  Lc Hernandez is a 65 year old patient who returns for follow up of his posterior uveitis and choroidal neovascularization of the left eye. At last visit, we did an Avastin injection in the left eye. This was well tolerated     Interval Updates to Medical/Family/Social History:  No new health changes    Relevant Review of Systems Updates:  No coughs/colds, no headaches.    Laboratory Testing: None since last visit      Current eye related medications: AREDS vitamins. Cosopt 2x/day in the left eye     Retina/Uveitis Imaging:   OCT Spectralis Macula November 30, 2020  right eye: Drusenoid changes, no fluid. Stable  left eye: Improving superior smaller PED, stable larger PED.    Assessment:     1. Posterior uveitis, left eye  Stable mild inflammation     2. Choroidal retinal neovascularization of left eye  Improves after Avastin in the left eye.     3. Cystoid macular edema, left eye  No bonifacio CME    4. Intermediate stage dry age-related macular degeneration of both eyes  Stable drusenoid changes in the right eye without exudation. Improved in the left eye after Avastin    5. Ocular hypertension, left eye  Improved on Cosopt 2x/day.     Plan/Recommendations:      Discussed findings with patient. The inflammation is mild and the choroidal neovascularization has improved after the Avastin in the left eye. Recommend Avastin in again in the left eye to continue treating the abnormal vessels. This was performed without complication    The inflammation has not recurred off prednisone, so this does not need to be restarted    No additional testing recommended    Continue Cosopt 2x/day in the left eye until 3 days before the next visit. If the pressure is up again next visit, we might then continue Cosopt indefinitely    RTC 4 weeks IOP, dilate, OCT, Avastin left eye      Physician Attestation     Attending Physician  Attestation:  Complete documentation of historical and exam elements from today's encounter can be found in the full encounter summary report (not reduplicated in this progress note). I personally obtained the chief complaint(s) and history of present illness. I confirmed and edited as necessary the review of systems, past medical/surgical history, family history, social history, and examination findings as documented by others; and I examined the patient myself. I personally reviewed the relevant tests, images, and reports as documented above. I formulated and edited as necessary the assessment and plan and discussed the findings and management plan with the patient and family members present at the time of this visit.  Nba Mcnally M.D., Uveitis and Medical Retina, November 30, 2020

## 2020-12-29 ENCOUNTER — OFFICE VISIT (OUTPATIENT)
Dept: OPHTHALMOLOGY | Facility: CLINIC | Age: 65
End: 2020-12-29
Attending: OPHTHALMOLOGY
Payer: MEDICARE

## 2020-12-29 DIAGNOSIS — H30.92 POSTERIOR UVEITIS, LEFT EYE: ICD-10-CM

## 2020-12-29 DIAGNOSIS — H40.052 OCULAR HYPERTENSION, LEFT EYE: ICD-10-CM

## 2020-12-29 DIAGNOSIS — H35.3132 INTERMEDIATE STAGE DRY AGE-RELATED MACULAR DEGENERATION OF BOTH EYES: ICD-10-CM

## 2020-12-29 DIAGNOSIS — H35.052 CHOROIDAL RETINAL NEOVASCULARIZATION OF LEFT EYE: Primary | ICD-10-CM

## 2020-12-29 PROCEDURE — 67028 INJECTION EYE DRUG: CPT | Mod: LT | Performed by: OPHTHALMOLOGY

## 2020-12-29 PROCEDURE — 250N000011 HC RX IP 250 OP 636: Performed by: OPHTHALMOLOGY

## 2020-12-29 PROCEDURE — 92134 CPTRZ OPH DX IMG PST SGM RTA: CPT | Performed by: OPHTHALMOLOGY

## 2020-12-29 PROCEDURE — G0463 HOSPITAL OUTPT CLINIC VISIT: HCPCS

## 2020-12-29 RX ADMIN — Medication 1.25 MG: at 13:48

## 2020-12-29 ASSESSMENT — REFRACTION_WEARINGRX
OD_AXIS: 001
OD_ADD: +2.25
OD_CYLINDER: +2.50
OS_ADD: +2.25
OS_AXIS: 100
OS_SPHERE: -6.25
OD_SPHERE: -5.50
OS_CYLINDER: +1.75
SPECS_TYPE: PAL

## 2020-12-29 ASSESSMENT — EXTERNAL EXAM - LEFT EYE: OS_EXAM: NORMAL

## 2020-12-29 ASSESSMENT — TONOMETRY
OS_IOP_MMHG: 19
IOP_METHOD: TONOPEN
OD_IOP_MMHG: 19
IOP_METHOD: TONOPEN
OS_IOP_MMHG: 19

## 2020-12-29 ASSESSMENT — SLIT LAMP EXAM - LIDS
COMMENTS: MILD MGD
COMMENTS: MILD MGD

## 2020-12-29 ASSESSMENT — VISUAL ACUITY
OD_CC: 20/20
CORRECTION_TYPE: GLASSES
OS_CC+: -1
METHOD: SNELLEN - LINEAR
OS_CC: 20/40
OD_CC+: -1

## 2020-12-29 ASSESSMENT — EXTERNAL EXAM - RIGHT EYE: OD_EXAM: NORMAL

## 2020-12-29 ASSESSMENT — CONF VISUAL FIELD
OD_NORMAL: 1
OS_NORMAL: 1
METHOD: COUNTING FINGERS

## 2020-12-29 ASSESSMENT — CUP TO DISC RATIO
OS_RATIO: 0.8
OD_RATIO: 0.4

## 2020-12-29 NOTE — LETTER
12/29/2020       RE: Lc Hernandez  [address] [facility] [id number]     Dear Colleague,    Thank you for referring your patient, Lc Hernandez, to the [facility] EYE [facility] at [facility]. Please see a copy of my visit note below.    Chief Complaint/Presenting Concern:  Retina follow up     Interval History of Present Ocular Illness:  Lc Hernandez is a 65 year old patient who returns for follow up of the CNV in the left eye. At last visit on 11/30/20, he received an Avastin injection in the left eye. He reports an improvement in the splotches in the vision and some mild waviness in the left eye.     Mr. Hernandez has not used the eye pressure drop in the left eye for the last two days    Interval Updates to Medical/Family/Social History:  No changes to health or medications    Relevant Review of Systems Updates:  No coughs/colds, no headaches.    Laboratory Testing: None since last visit     Current eye related medications: Cosopt 2x/day left eye (until a few days ago), AREDS Vitamins    Retina/Uveitis Imaging:   OCT Spectralis Macula December 29, 2020  right eye: Drusenoid changes, no fluid. Stable   left eye: PED improving    Assessment:     1. Choroidal retinal neovascularization of left eye  The pigment epithelial detachment continues to improve in the left eye after monthly Avastin x 2.    2. Intermediate stage dry age-related macular degeneration of both eyes  Stable in the right eye, CNV left eye continues to improve.     3. Posterior uveitis, left eye  No recurrence off prednisone for several months.    4. Ocular hypertension, left eye  Normal today, off Cosopt for the last few days. Optic disc with cupping but no hemorrhages.     Plan/Recommendations:      Discussed findings with patient. The choroidal neovascularization in the left eye continues to improve after Avastin. Recommend Avastin again in the left eye today and return in 4 weeks.  Risks/benefits/altneratives reviewed. Performed without complication.    We discussed continuing with monthly injections for the CNV in the left eye until the PED is stable/resolved, then slowly extend interval. We discussed that there may be a point at which the injections can be stopped, but we will continue monitoring the left eye (as well as the right eye, which remains without any signs of such changes)    The uveitis has not flared off prednisone, so no specific uveitis therapy (oral prednisone or steroid drops) needed at this time.     Although the eye pressure is normal today, given prior changes to the optic nerve, recommend continuing Cosopt 2x/day in the left eye until one week before the next visit, then reduce to once daily starting week before the next visit.     Continue AREDS2 vitamins which are helping protect both eyes.    RTC 4 weeks IOP dilate left eye only , OCT (ordered), Avastin left eye     Physician Attestation     Attending Physician Attestation:  Complete documentation of historical and exam elements from today's encounter can be found in the full encounter summary report (not reduplicated in this progress note). I personally obtained the chief complaint(s) and history of present illness. I confirmed and edited as necessary the review of systems, past medical/surgical history, family history, social history, and examination findings as documented by others; and I examined the patient myself. I personally reviewed the relevant tests, images, and reports as documented above. I formulated and edited as necessary the assessment and plan and discussed the findings and management plan with the patient and family members present at the time of this visit.  Nba Mcnally M.D., Uveitis and Medical Retina, December 29, 2020     Sincerely,    Nba Mcnally MD  [facility] Dept of Ophthalmology  Uveitis and Medical Retina

## 2020-12-29 NOTE — NURSING NOTE
Chief Complaints and History of Present Illnesses   Patient presents with     Uveitis Follow-Up     Posterior uveitis, left eye      Chief Complaint(s) and History of Present Illness(es)     Uveitis Follow-Up     Laterality: left eye    Onset: sudden    Onset: months ago    Course: stable    Associated symptoms: Negative for eye pain, dryness, tearing, flashes, floaters and photophobia    Pain scale: 0/10    Comments: Posterior uveitis, left eye               Comments     Pt here for Posterior Uveitis LE.    Pt describes vision is different.  Vision used to be blotchy and now has a little wavy on the right of vision with blotchy.    Pt is not taking any drops.    Yolande Child, ROMMEL December 29, 2020 12:42 PM

## 2020-12-29 NOTE — PROGRESS NOTES
Chief Complaint/Presenting Concern:  Retina follow up     Interval History of Present Ocular Illness:  Lc Hernandez is a 65 year old patient who returns for follow up of the CNV in the left eye. At last visit on 11/30/20, he received an Avastin injection in the left eye. He reports an improvement in the splotches in the vision and some mild waviness in the left eye.     Mr. Hernandez has not used the eye pressure drop in the left eye for the last two days    Interval Updates to Medical/Family/Social History:  No changes to health or medications    Relevant Review of Systems Updates:  No coughs/colds, no headaches.    Laboratory Testing: None since last visit     Current eye related medications: Cosopt 2x/day left eye (until a few days ago), AREDS Vitamins    Retina/Uveitis Imaging:   OCT Spectralis Macula December 29, 2020  right eye: Drusenoid changes, no fluid. Stable   left eye: PED improving    Assessment:     1. Choroidal retinal neovascularization of left eye  The pigment epithelial detachment continues to improve in the left eye after monthly Avastin x 2.    2. Intermediate stage dry age-related macular degeneration of both eyes  Stable in the right eye, CNV left eye continues to improve.     3. Posterior uveitis, left eye  No recurrence off prednisone for several months.    4. Ocular hypertension, left eye  Normal today, off Cosopt for the last few days. Optic disc with cupping but no hemorrhages.     Plan/Recommendations:      Discussed findings with patient. The choroidal neovascularization in the left eye continues to improve after Avastin. Recommend Avastin again in the left eye today and return in 4 weeks. Risks/benefits/altneratives reviewed. Performed without complication.    We discussed continuing with monthly injections for the CNV in the left eye until the PED is stable/resolved, then slowly extend interval. We discussed that there may be a point at which the injections can be stopped, but we will  continue monitoring the left eye (as well as the right eye, which remains without any signs of such changes)    The uveitis has not flared off prednisone, so no specific uveitis therapy (oral prednisone or steroid drops) needed at this time.     Although the eye pressure is normal today, given prior changes to the optic nerve, recommend continuing Cosopt 2x/day in the left eye until one week before the next visit, then reduce to once daily starting week before the next visit.     Continue AREDS2 vitamins which are helping protect both eyes.    RTC 4 weeks IOP dilate left eye only , OCT (ordered), Avastin left eye     Physician Attestation     Attending Physician Attestation:  Complete documentation of historical and exam elements from today's encounter can be found in the full encounter summary report (not reduplicated in this progress note). I personally obtained the chief complaint(s) and history of present illness. I confirmed and edited as necessary the review of systems, past medical/surgical history, family history, social history, and examination findings as documented by others; and I examined the patient myself. I personally reviewed the relevant tests, images, and reports as documented above. I formulated and edited as necessary the assessment and plan and discussed the findings and management plan with the patient and family members present at the time of this visit.  Nba Mcnally M.D., Uveitis and Medical Retina, December 29, 2020

## 2021-01-04 ENCOUNTER — HEALTH MAINTENANCE LETTER (OUTPATIENT)
Age: 66
End: 2021-01-04

## 2021-01-27 ENCOUNTER — OFFICE VISIT (OUTPATIENT)
Dept: OPHTHALMOLOGY | Facility: CLINIC | Age: 66
End: 2021-01-27
Attending: OPHTHALMOLOGY
Payer: MEDICARE

## 2021-01-27 DIAGNOSIS — H35.3132 INTERMEDIATE STAGE DRY AGE-RELATED MACULAR DEGENERATION OF BOTH EYES: ICD-10-CM

## 2021-01-27 DIAGNOSIS — H40.052 OCULAR HYPERTENSION, LEFT EYE: ICD-10-CM

## 2021-01-27 DIAGNOSIS — H30.92 POSTERIOR UVEITIS, LEFT EYE: ICD-10-CM

## 2021-01-27 DIAGNOSIS — H35.052 CHOROIDAL RETINAL NEOVASCULARIZATION OF LEFT EYE: Primary | ICD-10-CM

## 2021-01-27 PROCEDURE — 250N000011 HC RX IP 250 OP 636: Performed by: OPHTHALMOLOGY

## 2021-01-27 PROCEDURE — 67028 INJECTION EYE DRUG: CPT | Mod: LT | Performed by: OPHTHALMOLOGY

## 2021-01-27 PROCEDURE — 99207 PR DROP WITH A PROCEDURE: CPT | Performed by: OPHTHALMOLOGY

## 2021-01-27 PROCEDURE — G0463 HOSPITAL OUTPT CLINIC VISIT: HCPCS | Mod: 25

## 2021-01-27 PROCEDURE — 92134 CPTRZ OPH DX IMG PST SGM RTA: CPT | Performed by: OPHTHALMOLOGY

## 2021-01-27 RX ORDER — DORZOLAMIDE HYDROCHLORIDE AND TIMOLOL MALEATE 20; 5 MG/ML; MG/ML
1 SOLUTION/ DROPS OPHTHALMIC DAILY
Qty: 10 ML | Refills: 6 | Status: SHIPPED | OUTPATIENT
Start: 2021-01-27 | End: 2021-06-07

## 2021-01-27 RX ADMIN — Medication 1.25 MG: at 15:01

## 2021-01-27 ASSESSMENT — CONF VISUAL FIELD
OS_NORMAL: 1
METHOD: COUNTING FINGERS
OD_NORMAL: 1

## 2021-01-27 ASSESSMENT — VISUAL ACUITY
OS_CC: 20/40
OS_CC+: +2
OD_CC: 20/20
METHOD: SNELLEN - LINEAR
CORRECTION_TYPE: GLASSES

## 2021-01-27 ASSESSMENT — REFRACTION_WEARINGRX
OS_CYLINDER: +1.75
OD_AXIS: 001
SPECS_TYPE: PAL
OD_CYLINDER: +2.50
OD_SPHERE: -5.50
OS_SPHERE: -6.25
OS_ADD: +2.25
OD_ADD: +2.25
OS_AXIS: 100

## 2021-01-27 ASSESSMENT — CUP TO DISC RATIO
OS_RATIO: 0.8
OD_RATIO: 0.4

## 2021-01-27 ASSESSMENT — EXTERNAL EXAM - LEFT EYE: OS_EXAM: NORMAL

## 2021-01-27 ASSESSMENT — TONOMETRY
IOP_METHOD: TONOPEN
OD_IOP_MMHG: 19
OS_IOP_MMHG: 17

## 2021-01-27 ASSESSMENT — SLIT LAMP EXAM - LIDS
COMMENTS: MILD MGD
COMMENTS: MILD MGD

## 2021-01-27 ASSESSMENT — EXTERNAL EXAM - RIGHT EYE: OD_EXAM: NORMAL

## 2021-01-27 NOTE — PROGRESS NOTES
Chief Complaint/Presenting Concern:  Uveitis     Interval History of Present Ocular Illness:  Lc Hernandez is a 66 year old patient who returns for follow up of the CNV in the left eye. At last month, we did an Avastin injection in the left eye and recommended follow up today. Mr. Hernandez also continued Cosopt 2x/day until one week ago, when he reduced it to just once daily in the left eye. Overall, things are doing well.     Interval Updates to Medical/Family/Social History:  No changes to health or medications    Relevant Review of Systems Updates: No coughs/colds or other issues    Laboratory Testing: None recently     Current eye related medications: Preservision, Cosopt daily left eye (this frequency for the last month)    Retina/Uveitis Imaging:   OCT Spectralis Macula January 27, 2021  right eye: Drusen, no fluid. Stable   left eye: Flatter PED, improved.     Assessment:     1. Choroidal retinal neovascularization of left eye  Flatter pigment epithelial detachment    2. Posterior uveitis, left eye  Without active recurrent inflammation off drops for several months    3. Ocular hypertension, left eye  IOP controlled on Cosopt 2x/day generally and once daily for the last week.     4. Intermediate stage dry age-related macular degeneration of both eyes  Stable drusen in the right eye and PED as above in the left eye.     Plan/Recommendations:      Discussed findings with patient. As the fluid has improved again in the macula of the left eye, we will continue Avastin monthly until this has stabilized. Avastin given in the left eye today, no complications.    No oral steroid or steroid eye drops needed for left eye as no recurrence of inflammation    Do not recommend additional diagnostic testing    Continue Preservision vitamins and Dorzolamide-Timolol (Cosopt) daily in the left eye. We will recheck the optic nerve scan next month. If things are stable, we might continue daily Cosopt. If the eye pressure is higher  or the optic nerve scan is worse, we might recommend twice daily use as we had done previously    Regarding the COVID-19 vaccine, recommend getting one when possible.     RTC 1 month IOP, dilate left eye, OCT, RNFL (Ordered), Avastin left eye     Physician Attestation     Attending Physician Attestation:  Complete documentation of historical and exam elements from today's encounter can be found in the full encounter summary report (not reduplicated in this progress note). I personally obtained the chief complaint(s) and history of present illness. I confirmed and edited as necessary the review of systems, past medical/surgical history, family history, social history, and examination findings as documented by others; and I examined the patient myself. I personally reviewed the relevant tests, images, and reports as documented above. I formulated and edited as necessary the assessment and plan and discussed the findings and management plan with the patient and family members present at the time of this visit.  Nba Mcnally M.D., Uveitis and Medical Retina, January 27, 2021

## 2021-01-27 NOTE — PATIENT INSTRUCTIONS
Continue Preservision vitamins and Dorzolamide-Timolol (Cosopt) daily in the left eye. We will recheck the optic nerve scan next month. If things are stable, we might continue daily Cosopt. If the eye pressure is higher or the optic nerve scan is worse, we might recommend twice daily use as we had done previously    Hope you can get the vaccine soon. We will see you next month!

## 2021-01-27 NOTE — NURSING NOTE
Chief Complaints and History of Present Illnesses   Patient presents with     Retinal Neovascularization Follow Up     Chief Complaint(s) and History of Present Illness(es)     Retinal Neovascularization Follow Up     Laterality: left eye    Onset: months ago    Quality: Feels the va is getting better      Associated symptoms: Negative for floaters, flashes and photophobia    Pain scale: 0/10              Comments     Here for Choroidal retinal neovascularization of left eye   Cosopt daily left eye   Rand Riveramilviaon COT 1:28 PM January 27, 2021

## 2021-03-02 ENCOUNTER — OFFICE VISIT (OUTPATIENT)
Dept: OPHTHALMOLOGY | Facility: CLINIC | Age: 66
End: 2021-03-02
Attending: OPHTHALMOLOGY
Payer: MEDICARE

## 2021-03-02 DIAGNOSIS — H30.92 POSTERIOR UVEITIS, LEFT EYE: ICD-10-CM

## 2021-03-02 DIAGNOSIS — H40.052 OCULAR HYPERTENSION, LEFT EYE: ICD-10-CM

## 2021-03-02 DIAGNOSIS — H35.3132 INTERMEDIATE STAGE DRY AGE-RELATED MACULAR DEGENERATION OF BOTH EYES: ICD-10-CM

## 2021-03-02 DIAGNOSIS — H35.052 CHOROIDAL RETINAL NEOVASCULARIZATION OF LEFT EYE: Primary | ICD-10-CM

## 2021-03-02 PROCEDURE — 67028 INJECTION EYE DRUG: CPT | Mod: LT | Performed by: OPHTHALMOLOGY

## 2021-03-02 PROCEDURE — 92133 CPTRZD OPH DX IMG PST SGM ON: CPT | Performed by: OPHTHALMOLOGY

## 2021-03-02 PROCEDURE — 92134 CPTRZ OPH DX IMG PST SGM RTA: CPT | Performed by: OPHTHALMOLOGY

## 2021-03-02 PROCEDURE — 250N000011 HC RX IP 250 OP 636

## 2021-03-02 PROCEDURE — G0463 HOSPITAL OUTPT CLINIC VISIT: HCPCS

## 2021-03-02 ASSESSMENT — SLIT LAMP EXAM - LIDS
COMMENTS: MILD MGD
COMMENTS: MILD MGD

## 2021-03-02 ASSESSMENT — EXTERNAL EXAM - LEFT EYE: OS_EXAM: NORMAL

## 2021-03-02 ASSESSMENT — REFRACTION_WEARINGRX
OS_CYLINDER: +1.75
OD_CYLINDER: +2.50
OD_ADD: +2.25
SPECS_TYPE: PAL
OS_ADD: +2.25
OD_AXIS: 001
OS_AXIS: 100
OS_SPHERE: -6.25
OD_SPHERE: -5.50

## 2021-03-02 ASSESSMENT — CUP TO DISC RATIO
OS_RATIO: 0.8
OD_RATIO: 0.4

## 2021-03-02 ASSESSMENT — TONOMETRY
OD_IOP_MMHG: 18
IOP_METHOD: TONOPEN
OS_IOP_MMHG: 19

## 2021-03-02 ASSESSMENT — VISUAL ACUITY
OD_CC: 20/20
OS_CC: 20/20
OS_CC+: -2
METHOD: SNELLEN - LINEAR
CORRECTION_TYPE: GLASSES

## 2021-03-02 ASSESSMENT — CONF VISUAL FIELD
METHOD: COUNTING FINGERS
OS_NORMAL: 1
OD_NORMAL: 1

## 2021-03-02 ASSESSMENT — EXTERNAL EXAM - RIGHT EYE: OD_EXAM: NORMAL

## 2021-03-02 NOTE — PROGRESS NOTES
Chief Complaint/Presenting Concern: Retina follow-up    Interval History of Present Ocular Illness:  Lc Hernandez is a 66 year old patient who returns for follow up of his choroidal neovascularization of the left eye.  He was last seen on January 27, 2021 at which time there was continued interval improvement in the fluid in the left eye associated with the CNV.  We performed an Avastin injection in the left eye and recommend follow-up today.  Recommended continuing Cosopt daily in the left eye and repeating optic nerve scan at the visit today to determine its continued utility going forward.    In the interim, Mr. Hernandez reports the vision is doing well and notably improved in the left eye.     Interval Updates to Medical/Family/Social History:  Will get first COVID shots tomorrow with his Wife.     Relevant Review of Systems Updates:  No other health changes.      Current eye related medications: Cosopt daily left eye, AREDS 2 Vitamins    Retina/Uveitis Imaging:   OCT Spectralis Macula March 2, 2021  right eye: Drusen, no fluid. Stable  left eye: PED resolved with only focal area of RPE elevation    OCT Optic Nerve RNFL Spectralis March 2, 2021  right eye: Avg thickness 81 microns. Stable borderline changes temporal and superonasal.   left eye: Avg thickness 47 microns. Stable thinning superior and inferior.     Assessment:     1. Choroidal retinal neovascularization of left eye  Resolved Pigment epithelial detachment in the left eye after several monthly Avastin injections    2. Posterior uveitis, left eye  No recurrence of inflammation off steroid drops and prednisone for months. As above, CNV better    3. Ocular hypertension, left eye  Controlled on Cosopt daily    4. Intermediate stage dry age-related macular degeneration of both eyes  Stable without exudation in the right eye. The left eye as above    Plan/Recommendations:      Discussed findings with patient. The CNV in the left eye has resolved with monthly  Avastin injections, now 5 weeks after the last. We discussed another injection today to help prevent recurrence and then extending to 6 weeks. Avastin given left eye, no complications.    Continue AREDS 2 vitamins to help with stabilization in the left eye and to protect right eye from exudation.     Regarding the eye pressure in the left eye. Although this is controlled on Cosopt daily, given the degree of optic nerve changes (which are stable), recommend continued use 2x/day which can help keep pressure reduced and prevent fluctuations in the pressure. This can help optimize the peripheral vision in the left eye for the long term.     Regarding the COVID-19 vaccine, congratulations on being able to get these scheduled in the next few weeks.    Okay to update glasses anytime     Return for 6 weeks , Tonopen, dilate OS, OCT (ordered), Avastin OS .     Physician Attestation     Attending Physician Attestation:  Complete documentation of historical and exam elements from today's encounter can be found in the full encounter summary report (not reduplicated in this progress note). I personally obtained the chief complaint(s) and history of present illness. I confirmed and edited as necessary the review of systems, past medical/surgical history, family history, social history, and examination findings as documented by others; and I examined the patient myself. I personally reviewed the relevant tests, images, and reports as documented above. I formulated and edited as necessary the assessment and plan and discussed the findings and management plan with the patient and family members present at the time of this visit.  Nba Mcnally M.D., Uveitis and Medical Retina, March 2, 2021

## 2021-03-02 NOTE — PATIENT INSTRUCTIONS
You did great with the injection. Please continue the Cosopt 2x/day in the left eye and we will see you back in 6 weeks.

## 2021-03-02 NOTE — NURSING NOTE
Chief Complaints and History of Present Illnesses   Patient presents with     Retinal Neovascularization Follow Up     Chief Complaint(s) and History of Present Illness(es)     Retinal Neovascularization Follow Up     Laterality: left eye    Onset: months ago    Quality: States va is the same since last visit      Associated symptoms: Negative for floaters, flashes and photophobia    Treatments tried: eye drops    Pain scale: 0/10              Comments     Here for Choroidal retinal neovascularization of left eye   Dorzolamide-Timolol (Cosopt) daily in the left eye  Rand Zapata COT 8:27 AM March 2, 2021

## 2021-03-02 NOTE — LETTER
3/2/2021       RE: Lc Hernandez  [address] [facility] [id number]     Dear Colleague,    Thank you for referring your patient, Lc Hernandez, to the [facility]YE [facility] at [facility]. Please see a copy of my visit note below.    Chief Complaint/Presenting Concern: Retina follow-up    Interval History of Present Ocular Illness:  Lc Hernandez is a 66 year old patient who returns for follow up of his choroidal neovascularization of the left eye.  He was last seen on January 27, 2021 at which time there was continued interval improvement in the fluid in the left eye associated with the CNV.  We performed an Avastin injection in the left eye and recommend follow-up today.  Recommended continuing Cosopt daily in the left eye and repeating optic nerve scan at the visit today to determine its continued utility going forward.    In the interim, Mr. Hernandez reports the vision is doing well and notably improved in the left eye.     Interval Updates to Medical/Family/Social History:  Will get first COVID shots tomorrow with his wife.     Relevant Review of Systems Updates:  No other health changes.      Current eye related medications: Cosopt daily left eye, AREDS 2 vitamins    Retina/Uveitis Imaging:   OCT Spectralis Macula March 2, 2021  right eye: Drusen, no fluid. Stable  left eye: PED resolved with only focal area of RPE elevation    OCT Optic Nerve RNFL Spectralis March 2, 2021  right eye: Avg thickness 81 microns. Stable borderline changes temporal and superonasal.   left eye: Avg thickness 47 microns. Stable thinning superior and inferior.       Assessment:     1. Choroidal retinal neovascularization of left eye  Resolved pigment epithelial detachment in the left eye after several monthly Avastin injections    2. Posterior uveitis, left eye  No recurrence of inflammation off steroid drops and prednisone for months. As above, CNV better    3. Ocular  hypertension, left eye  Controlled on Cosopt daily    4. Intermediate stage dry age-related macular degeneration of both eyes  Stable without exudation in the right eye. The left eye as above    Plan/Recommendations:      Discussed findings with patient. The CNV in the left eye has resolved with monthly Avastin injections, now 5 weeks after the last. We discussed another injection today to help prevent recurrence and then extending to 6 weeks. Avastin given left eye, no complications.    Continue AREDS 2 vitamins to help with stabilization in the left eye and to protect right eye from exudation.     Regarding the eye pressure in the left eye. Although this is controlled on Cosopt daily, given the degree of optic nerve changes (which are stable), recommend continued use 2x/day which can help keep pressure reduced and prevent fluctuations in the pressure. This can help optimize the peripheral vision in the left eye for the long term.     Regarding the COVID-19 vaccine, congratulations on being able to get these scheduled in the next few weeks.    Okay to update glasses anytime     Return for 6 weeks , Tonopen, dilate OS, OCT (ordered), Avastin OS .     Physician Attestation     Attending Physician Attestation:  Complete documentation of historical and exam elements from today's encounter can be found in the full encounter summary report (not reduplicated in this progress note). I personally obtained the chief complaint(s) and history of present illness. I confirmed and edited as necessary the review of systems, past medical/surgical history, family history, social history, and examination findings as documented by others; and I examined the patient myself. I personally reviewed the relevant tests, images, and reports as documented above. I formulated and edited as necessary the assessment and plan and discussed the findings and management plan with the patient and family members present at the time of this  visit.  Nba Mcnally M.D., Uveitis and Medical Retina, March 2, 2021     Sincerely,    Nba Mcnally MD  [facility] Dept of Ophthalmology  Uveitis and Medical Retina

## 2021-04-14 ENCOUNTER — OFFICE VISIT (OUTPATIENT)
Dept: OPHTHALMOLOGY | Facility: CLINIC | Age: 66
End: 2021-04-14
Attending: OPHTHALMOLOGY
Payer: MEDICARE

## 2021-04-14 DIAGNOSIS — H40.052 OCULAR HYPERTENSION, LEFT EYE: ICD-10-CM

## 2021-04-14 DIAGNOSIS — H30.92 POSTERIOR UVEITIS, LEFT EYE: ICD-10-CM

## 2021-04-14 DIAGNOSIS — H35.052 CHOROIDAL RETINAL NEOVASCULARIZATION OF LEFT EYE: Primary | ICD-10-CM

## 2021-04-14 DIAGNOSIS — H35.3132 INTERMEDIATE STAGE DRY AGE-RELATED MACULAR DEGENERATION OF BOTH EYES: ICD-10-CM

## 2021-04-14 PROCEDURE — 99207 PR DROP WITH A PROCEDURE: CPT | Performed by: OPHTHALMOLOGY

## 2021-04-14 PROCEDURE — 99207 FUNDUS AUTOFLUORESCENCE IMAGE (FAF) OU (BOTH EYES): CPT | Performed by: OPHTHALMOLOGY

## 2021-04-14 PROCEDURE — 250N000011 HC RX IP 250 OP 636: Performed by: OPHTHALMOLOGY

## 2021-04-14 PROCEDURE — 92134 CPTRZ OPH DX IMG PST SGM RTA: CPT | Performed by: OPHTHALMOLOGY

## 2021-04-14 PROCEDURE — 92250 FUNDUS PHOTOGRAPHY W/I&R: CPT | Mod: 59 | Performed by: OPHTHALMOLOGY

## 2021-04-14 PROCEDURE — 67028 INJECTION EYE DRUG: CPT | Mod: LT | Performed by: OPHTHALMOLOGY

## 2021-04-14 PROCEDURE — G0463 HOSPITAL OUTPT CLINIC VISIT: HCPCS | Mod: 25

## 2021-04-14 RX ADMIN — Medication 1.25 MG: at 09:59

## 2021-04-14 ASSESSMENT — EXTERNAL EXAM - RIGHT EYE: OD_EXAM: NORMAL

## 2021-04-14 ASSESSMENT — CUP TO DISC RATIO
OD_RATIO: 0.4
OS_RATIO: 0.8

## 2021-04-14 ASSESSMENT — REFRACTION_WEARINGRX
OD_CYLINDER: +2.50
OS_CYLINDER: +1.75
OD_AXIS: 001
SPECS_TYPE: PAL
OD_SPHERE: -5.50
OD_ADD: +2.25
OS_AXIS: 100
OS_SPHERE: -6.25
OS_ADD: +2.25

## 2021-04-14 ASSESSMENT — VISUAL ACUITY
CORRECTION_TYPE: GLASSES
OS_CC+: -2
OS_CC: 20/20
OD_CC: 20/20
METHOD: SNELLEN - LINEAR

## 2021-04-14 ASSESSMENT — EXTERNAL EXAM - LEFT EYE: OS_EXAM: NORMAL

## 2021-04-14 ASSESSMENT — SLIT LAMP EXAM - LIDS
COMMENTS: MILD MGD
COMMENTS: MILD MGD

## 2021-04-14 ASSESSMENT — TONOMETRY
OD_IOP_MMHG: 21
OS_IOP_MMHG: 20
IOP_METHOD: TONOPEN

## 2021-04-14 ASSESSMENT — CONF VISUAL FIELD: OS_NORMAL: 1

## 2021-04-14 NOTE — PATIENT INSTRUCTIONS
Continue Cosopt 2x/day in the left eye only    Do not recommend additional diagnostic testing. We will plan to repeat Visual Field testing at next visit    Continue AREDS 2 Vitamins    We will see you back in 8 weeks

## 2021-04-14 NOTE — PROGRESS NOTES
Chief Complaint/Presenting Concern:  Retina follow up    Interval History of Present Ocular Illness:  Lc Hernandez is a 66 year old patient who returns for follow up of his choroidal neovascularization of the left eye.  He was last seen on March 2, 2021 at which time the pigment epithelial detachment in the left eye resolved by OCT we elected to perform an Avastin injection and extend the interval between injections to 6 weeks (today). Since that visit, Mr. Hernandez reports the vision is doing well.     Interval Updates to Medical/Family/Social History:    Did well with COVID vaccination.    Relevant Review of Systems Updates:  No coughs/colds, no headaches.     Laboratory Testing: None recently relevant     Current eye related medications: Cosopt 2x/day left eye, AREDS Vitamins 2x/day     Retina/Uveitis Imaging:   OCT Spectralis Macula April 14, 2021  right eye: Drusen, no fluid. Thin choroid.   left eye: Drusen, no recurrent PED, thin choroid. Stable     Optos Photos and Autofluorescence April 14, 2021   right eye: Drusenoid changes, generally normal AF  left eye:  Drusenoid changes, stable hyper AF spots nasally, improved hyper AF central    Assessment:     1. Choroidal retinal neovascularization of left eye  Continues to remain inactive     2. Posterior uveitis, left eye  Minimal inflammation, no worse. No CME    3. Ocular hypertension, left eye  Upper normal on Cosopt 2x/day     4. Intermediate stage dry age-related macular degeneration of both eyes  Stable changes in the right eye, left eye as above.    Plan/Recommendations:      Discussed findings with patient. The CNV has not recurred in the left eye after six weeks. We recommended an Avastin injection in the left eye and extending the interval to 8 weeks    Eye pressure is upper normal in the right eye but still controlled in the left eye. Continue Cosopt 2x/day in the left eye only    The inflammation has not recurred in the left eye, so this can continue to  be monitored without steroid drops or shots.    Do not recommend additional diagnostic testing. We will plan to repeat Visual Field testing next time (and request previous studies) to determine our course for the eye pressure control in the left eye     Continue AREDS 2 Vitamins to help keep both eyes healthy    RTC 8 weeks Applanate, VF 24-2 then dilate OS, RNFL and OCT, ?Avastin left eye     Physician Attestation     Attending Physician Attestation:  Complete documentation of historical and exam elements from today's encounter can be found in the full encounter summary report (not reduplicated in this progress note). I personally obtained the chief complaint(s) and history of present illness. I confirmed and edited as necessary the review of systems, past medical/surgical history, family history, social history, and examination findings as documented by others; and I examined the patient myself. I personally reviewed the relevant tests, images, and reports as documented above. I formulated and edited as necessary the assessment and plan and discussed the findings and management plan with the patient and family members present at the time of this visit.  Nba Mcnally M.D., Uveitis and Medical Retina, April 14, 2021

## 2021-04-14 NOTE — NURSING NOTE
Chief Complaints and History of Present Illnesses   Patient presents with     Retinal Neovascularization Follow Up     Chief Complaint(s) and History of Present Illness(es)     Retinal Neovascularization Follow Up     Laterality: left eye    Onset: months ago    Quality: States that the va is doing good      Associated symptoms: flashes.  Negative for floaters and photophobia    Treatments tried: eye drops    Pain scale: 0/10              Comments     Here for Choroidal retinal neovascularization of left eye .  Cosopt BID left eye   Rand Zapata COT 8:28 AM April 14, 2021

## 2021-06-07 ENCOUNTER — OFFICE VISIT (OUTPATIENT)
Dept: OPHTHALMOLOGY | Facility: CLINIC | Age: 66
End: 2021-06-07
Attending: OPHTHALMOLOGY
Payer: MEDICARE

## 2021-06-07 DIAGNOSIS — H30.92 POSTERIOR UVEITIS, LEFT EYE: ICD-10-CM

## 2021-06-07 DIAGNOSIS — H40.052 OCULAR HYPERTENSION, LEFT EYE: ICD-10-CM

## 2021-06-07 DIAGNOSIS — H35.052 CHOROIDAL RETINAL NEOVASCULARIZATION OF LEFT EYE: Primary | ICD-10-CM

## 2021-06-07 DIAGNOSIS — H35.3132 INTERMEDIATE STAGE DRY AGE-RELATED MACULAR DEGENERATION OF BOTH EYES: ICD-10-CM

## 2021-06-07 PROCEDURE — 92134 CPTRZ OPH DX IMG PST SGM RTA: CPT | Performed by: OPHTHALMOLOGY

## 2021-06-07 PROCEDURE — 92083 EXTENDED VISUAL FIELD XM: CPT | Performed by: OPHTHALMOLOGY

## 2021-06-07 PROCEDURE — 92133 CPTRZD OPH DX IMG PST SGM ON: CPT | Performed by: OPHTHALMOLOGY

## 2021-06-07 PROCEDURE — G0463 HOSPITAL OUTPT CLINIC VISIT: HCPCS

## 2021-06-07 PROCEDURE — 99207 OCT OPTIC NERVE RNFL SPECTRALIS OU (BOTH EYES): CPT | Mod: 26 | Performed by: OPHTHALMOLOGY

## 2021-06-07 PROCEDURE — 99214 OFFICE O/P EST MOD 30 MIN: CPT | Performed by: OPHTHALMOLOGY

## 2021-06-07 RX ORDER — DORZOLAMIDE HYDROCHLORIDE AND TIMOLOL MALEATE 20; 5 MG/ML; MG/ML
1 SOLUTION/ DROPS OPHTHALMIC 2 TIMES DAILY
Qty: 10 ML | Refills: 6 | Status: SHIPPED | OUTPATIENT
Start: 2021-06-07 | End: 2021-09-13

## 2021-06-07 ASSESSMENT — REFRACTION_WEARINGRX
OS_AXIS: 100
OD_CYLINDER: +2.50
OS_CYLINDER: +1.75
OD_ADD: +2.25
OD_AXIS: 001
OS_SPHERE: -6.25
OS_ADD: +2.25
OD_SPHERE: -5.50
SPECS_TYPE: PAL

## 2021-06-07 ASSESSMENT — TONOMETRY
IOP_METHOD: APPLANATION
OD_IOP_MMHG: 21
OS_IOP_MMHG: 17

## 2021-06-07 ASSESSMENT — VISUAL ACUITY
OD_CC: 20/20
METHOD: SNELLEN - LINEAR
OS_CC: 20/20
OS_CC+: -2

## 2021-06-07 ASSESSMENT — SLIT LAMP EXAM - LIDS
COMMENTS: MILD MGD
COMMENTS: MILD MGD

## 2021-06-07 ASSESSMENT — EXTERNAL EXAM - LEFT EYE: OS_EXAM: NORMAL

## 2021-06-07 ASSESSMENT — EXTERNAL EXAM - RIGHT EYE: OD_EXAM: NORMAL

## 2021-06-07 ASSESSMENT — CUP TO DISC RATIO
OS_RATIO: 0.8
OD_RATIO: 0.4

## 2021-06-07 ASSESSMENT — CONF VISUAL FIELD
OS_NORMAL: 1
OD_NORMAL: 1

## 2021-06-07 NOTE — PATIENT INSTRUCTIONS
Continue Cosopt 2x/day in the left eye    Continue the AREDS 2 vitamins    Call/message is if anything changes. No injection today.

## 2021-06-07 NOTE — NURSING NOTE
Chief Complaints and History of Present Illnesses   Patient presents with     Follow Up     Chief Complaint(s) and History of Present Illness(es)     Follow Up     Laterality: left eye    Course: stable    Associated symptoms: Negative for eye pain, tearing, redness, flashes and dryness    Treatments tried: eye drops    Pain scale: 0/10              Comments     Pt states no change in VA since last visit BE  Ocular meds:  Cosopt 2x/day in the left eye only, last gtt 7:00AM      Sheron Rm COT 8:36 AM June 7, 2021

## 2021-06-07 NOTE — PROGRESS NOTES
Chief Complaint/Presenting Concern: Uveitis follow-up    Interval History of Present Ocular Illness:  Lc Hernandez is a 66 year old patient who returns for follow up of his choroidal neovascularization and ocular hypertension of the left eye.  He was last seen on April 14, 2021 at which time the choroidal neovascularization of the left eye was inactive after the Avastin injection given 6 weeks prior.  We recommended an Avastin injection in the left eye on that day and to extend the interval to 8 weeks.  We recommended continuing Cosopt twice a day in the left eye and to return for visual field testing at this visit today to help determine the long-term necessity for Cosopt in the left eye.    Since last visit, Mr. Hernandez reports things are well. He noticed with the visual field testing today that things were more challenging with the left eye.     Interval Updates to Medical/Family/Social History:    No new health issues, no travel plans.     Relevant Review of Systems Updates:  No coughs/colds, no headaches.     Laboratory Testing: None recently relevant     Current eye related medications: Cosopt 2x/day left eye, AREDS 2 vitamins     Retina/Uveitis Imaging:   OCT Spectralis Macula June 7, 2021  right eye: Drusen/drusenoid PED, no fluid. Stable   left eye: Drusenoid changes, no recurrence of PED. No fluid. Stable     OCT Optic Nerve RNFL Spectralis June 7, 2021  right eye: Avg thickness 80 microns, borderline temp and sup temp. Stable vs 3/2021  left eye: Avg thickness 47 microns, stable thin sup and inf vs 3/2021.    Octopus VF 24-2 June 7, 2021  right eye: Reliable. Inferior defect improved vs 7/2020 outside study scanned into Axis  left eye: High False negatives. Dense inferior defect, improved superior. MD -21.5dB (this is worse than prior -12.96db in July 2020, but gray scale improved)    Assessment:     1. Choroidal retinal neovascularization of left eye  Now 8 weeks after last Avastin, there is no recurrence  of the pigment epithelial detachment    2. Posterior uveitis, left eye  No recurrence    3. Ocular hypertension, left eye  Eye pressure upper normal. Visual field stable in the right eye and stable constriction in the left eye. Optic nerve RNFL scan also stable.     4. Intermediate stage dry age-related macular degeneration of both eyes  Drusenoid changes in each eye, but no signs of exudation in either eye.     Plan/Recommendations:      Discussed findings with patient. There is no recurrence of the pigment epithelial detachment (CNV) in the left eye. We discussed performing an Avastin injection and extending the interval versus observation.  Given significant improvement we elected to observe today and return in 1 month    There is no recurrence of the posterior uveitis so no topical or systemic steroids needed    Eye pressure is normal in the left eye on Cosopt 2x/day. RNFL scan stable borderline changes in the right eye and thin in the left eye.  As visual field shows improved defect in the right eye constriction in the left eye we recommended continuing Cosopt twice a day in the left eye to help preserve peripheral vision    Continue the AREDS 2 vitamins to protect both eyes     Return for 1 month dilate OS with, OCT (ordered), ?Avastin.     Physician Attestation     Attending Physician Attestation:  Complete documentation of historical and exam elements from today's encounter can be found in the full encounter summary report (not reduplicated in this progress note). I personally obtained the chief complaint(s) and history of present illness. I confirmed and edited as necessary the review of systems, past medical/surgical history, family history, social history, and examination findings as documented by others; and I examined the patient myself. I personally reviewed the relevant tests, images, and reports as documented above. I formulated and edited as necessary the assessment and plan and discussed the  findings and management plan with the patient and family members present at the time of this visit.  Nba Mcnally M.D., Uveitis and Medical Retina, June 7, 2021

## 2021-06-07 NOTE — LETTER
6/7/2021       RE: Lc Hernandez  [address] [address] [id number]     Dear Colleague,    Thank you for referring your patient, Lc Hernandez, to the [facility] EYE CLINIC at [facility]. Please see a copy of my visit note below.    Chief Complaint/Presenting Concern: Uveitis follow-up    Interval History of Present Ocular Illness:  Lc Hernandez is a 66 year old patient who returns for follow up of his choroidal neovascularization and ocular hypertension of the left eye.  He was last seen on April 14, 2021 at which time the choroidal neovascularization of the left eye was inactive after the Avastin injection given 6 weeks prior.  We recommended an Avastin injection in the left eye on that day and to extend the interval to 8 weeks.  We recommended continuing Cosopt twice a day in the left eye and to return for visual field testing at this visit today to help determine the long-term necessity for Cosopt in the left eye.    Since last visit, Mr. Hernandez reports things are well. He noticed with the visual field testing today that things were more challenging with the left eye.     Interval Updates to Medical/Family/Social History:    No new health issues, no travel plans.     Relevant Review of Systems Updates:  No coughs/colds, no headaches.     Laboratory Testing: None recently relevant     Current eye related medications: Cosopt 2x/day left eye, AREDS 2 vitamins     Retina/Uveitis Imaging:   OCT Spectralis Macula June 7, 2021  right eye: Drusen/drusenoid PED, no fluid. Stable   left eye: Drusenoid changes, no recurrence of PED. No fluid. Stable     OCT Optic Nerve RNFL Spectralis June 7, 2021  right eye: Avg thickness 80 microns, borderline temp and sup temp. Stable vs 3/2021  left eye: Avg thickness 47 microns, stable thin sup and inf vs 3/2021.    Octopus VF 24-2 June 7, 2021  right eye: Reliable. Inferior defect improved vs 7/2020 outside study scanned  into Axis  left eye: High False negatives. Dense inferior defect, improved superior. MD -21.5dB (this is worse than prior -12.96db in July 2020, but gray scale improved)    Assessment:     1. Choroidal retinal neovascularization of left eye  Now 8 weeks after last Avastin, there is no recurrence of the pigment epithelial detachment    2. Posterior uveitis, left eye  No recurrence    3. Ocular hypertension, left eye  Eye pressure upper normal. Visual field stable in the right eye and stable constriction in the left eye. Optic nerve RNFL scan also stable.     4. Intermediate stage dry age-related macular degeneration of both eyes  Drusenoid changes in each eye, but no signs of exudation in either eye.     Plan/Recommendations:      Discussed findings with patient. There is no recurrence of the pigment epithelial detachment (CNV) in the left eye. We discussed performing an Avastin injection and extending the interval versus observation.  Given significant improvement we elected to observe today and return in 1 month    Eye pressure is normal in the left eye on Cosopt 2x/day. RNFL scan stable borderline changes in the right eye and thin in the left eye.  As visual field shows improved defect in the right eye constriction in the left eye we recommended continuing Cosopt twice a day in the left eye to help preserve peripheral vision    Continue the AREDS 2 vitamins to protect both eyes     Return for 1 month dilate OS with, OCT (ordered), ?Avastin.     Physician Attestation     Attending Physician Attestation:  Complete documentation of historical and exam elements from today's encounter can be found in the full encounter summary report (not reduplicated in this progress note). I personally obtained the chief complaint(s) and history of present illness. I confirmed and edited as necessary the review of systems, past medical/surgical history, family history, social history, and examination findings as documented by others;  and I examined the patient myself. I personally reviewed the relevant tests, images, and reports as documented above. I formulated and edited as necessary the assessment and plan and discussed the findings and management plan with the patient and family members present at the time of this visit.  Nba Mcnally M.D., Uveitis and Medical Retina, June 7, 2021     Sincerely,    Nba Mcnally MD  [facility] Dept of Ophthalmology  Uveitis and Medical Retina

## 2021-07-19 ENCOUNTER — OFFICE VISIT (OUTPATIENT)
Dept: OPHTHALMOLOGY | Facility: CLINIC | Age: 66
End: 2021-07-19
Attending: OPHTHALMOLOGY
Payer: MEDICARE

## 2021-07-19 DIAGNOSIS — H35.3132 INTERMEDIATE STAGE DRY AGE-RELATED MACULAR DEGENERATION OF BOTH EYES: ICD-10-CM

## 2021-07-19 DIAGNOSIS — H40.052 OCULAR HYPERTENSION, LEFT EYE: ICD-10-CM

## 2021-07-19 DIAGNOSIS — H35.052 CHOROIDAL RETINAL NEOVASCULARIZATION OF LEFT EYE: Primary | ICD-10-CM

## 2021-07-19 DIAGNOSIS — H30.92 POSTERIOR UVEITIS, LEFT EYE: ICD-10-CM

## 2021-07-19 PROBLEM — N40.0 BENIGN PROSTATIC HYPERPLASIA: Status: ACTIVE | Noted: 2017-09-18

## 2021-07-19 PROBLEM — C61 ADENOCARCINOMA OF PROSTATE (H): Status: ACTIVE | Noted: 2019-06-05

## 2021-07-19 PROCEDURE — G0463 HOSPITAL OUTPT CLINIC VISIT: HCPCS

## 2021-07-19 PROCEDURE — 99213 OFFICE O/P EST LOW 20 MIN: CPT | Performed by: OPHTHALMOLOGY

## 2021-07-19 PROCEDURE — 92134 CPTRZ OPH DX IMG PST SGM RTA: CPT | Performed by: OPHTHALMOLOGY

## 2021-07-19 ASSESSMENT — CUP TO DISC RATIO
OS_RATIO: 0.8
OD_RATIO: 0.4

## 2021-07-19 ASSESSMENT — CONF VISUAL FIELD
METHOD: COUNTING FINGERS
OD_NORMAL: 1
OS_NORMAL: 1

## 2021-07-19 ASSESSMENT — REFRACTION_WEARINGRX
OS_CYLINDER: +1.75
OS_AXIS: 100
OS_SPHERE: -6.25
OS_ADD: +2.25
SPECS_TYPE: PAL
OD_CYLINDER: +2.50
OD_ADD: +2.25
OD_SPHERE: -5.50
OD_AXIS: 001

## 2021-07-19 ASSESSMENT — VISUAL ACUITY
OD_CC: 20/20
OS_CC: 20/25
CORRECTION_TYPE: GLASSES
OD_CC+: -1
OS_CC+: +2
METHOD: SNELLEN - LINEAR

## 2021-07-19 ASSESSMENT — SLIT LAMP EXAM - LIDS
COMMENTS: MILD MGD
COMMENTS: MILD MGD

## 2021-07-19 ASSESSMENT — EXTERNAL EXAM - RIGHT EYE: OD_EXAM: NORMAL

## 2021-07-19 ASSESSMENT — TONOMETRY
OD_IOP_MMHG: 20
IOP_METHOD: TONOPEN
OS_IOP_MMHG: 19

## 2021-07-19 ASSESSMENT — EXTERNAL EXAM - LEFT EYE: OS_EXAM: NORMAL

## 2021-07-19 NOTE — PROGRESS NOTES
Chief Complaint/Presenting Concern: Retina-Uveitis follow-up    Interval History of Present Ocular Illness:  Lc Hernandez is a 66 year old patient who returns for follow up of his choroidal neovascularization, ocular hypertension, posterior uveitis of the left eye.  He was last seen on June 7, 2021 at which time there was no recurrence of the CNV at 8 weeks from the prior Avastin injection.  We discussed treatment on extension versus observation of the CNV and elected to observe without Avastin and return for a visit today. We recommended continued use of Cosopt twice a day in the left eye to protect peripheral vision, but no steroid drops nor oral steroid given no recurrence of the posterior uveitis in the left eye. Since then, Mr. Hernandez reports things are doing well without any changes.     Interval Updates to Medical/Family/Social History:  No health changes    Relevant Review of Systems Updates:  No coughs/cold, no headaches.      Current eye related medications: AREDS 2 vitamins, Cosopt 2x/day left eye     Retina/Uveitis Imaging:   OCT Spectralis Macula July 19, 2021  right eye: Drusenoid PEDs, no fluid. Stable   left eye: Drusen, no recurrence of PED. Thin choroid    Assessment:     1. Choroidal retinal neovascularization of left eye  No recurrence now 3 months after last injection of Avastin    2. Posterior uveitis, left eye  Doing well without recurrence    3. Ocular hypertension, left eye  IOP 19 on Cosopt 2x/day left eye     4. Intermediate stage dry age-related macular degeneration of both eyes  Drusenoid changes right eye, no recurrence of CNV in the left eye     Plan/Recommendations:      Discussed findings with patient. There is no recurrence of the CNV in the left eye, so no Avastin injection needed in the left eye     The right eye remains without signs of exudation    Eye pressure is normal in the right eye and controlled in the left eye on Cosopt 2x/day    Do not recommend additional diagnostic  testing    Continue Cosopt 2x/day in the left eye and AREDS 2 vitamins    No steroid drops or oral prednisone needed as no recurrence of the uveitis in the left eye     RTC 6-8 weeks tonopen, dilate left eye, OCT (Ordered), ?Avastin left eye     Physician Attestation     Attending Physician Attestation:  Complete documentation of historical and exam elements from today's encounter can be found in the full encounter summary report (not reduplicated in this progress note). I personally obtained the chief complaint(s) and history of present illness. I confirmed and edited as necessary the review of systems, past medical/surgical history, family history, social history, and examination findings as documented by others; and I examined the patient myself. I personally reviewed the relevant tests, images, and reports as documented above. I formulated and edited as necessary the assessment and plan and discussed the findings and management plan with the patient and family members present at the time of this visit.  Nba Mcnally M.D., Uveitis and Medical Retina, July 19, 2021

## 2021-07-19 NOTE — NURSING NOTE
Chief Complaints and History of Present Illnesses   Patient presents with     Retinal Neovascularization Follow Up     Chief Complaint(s) and History of Present Illness(es)     Retinal Neovascularization Follow Up     Laterality: left eye    Onset: gradual    Onset: months ago    Quality: Feels the va is doing good      Severity: moderate    Frequency: intermittently    Associated symptoms: Negative for floaters, flashes and photophobia    Treatments tried: eye drops    Pain scale: 0/10              Comments     Here for Choroidal retinal neovascularization of left eye   Cosopt twice a day left eye  Rand Riveramilviaon COT 8:30 AM July 19, 2021

## 2021-07-19 NOTE — LETTER
7/19/2021       RE: Lc Hernandez  [address] [address] [id number]     Dear Colleague,    Thank you for referring your patient, Lc Hernandez, to the [facility] EYE [facility] at [facility]. Please see a copy of my visit note below.    Chief Complaint/Presenting Concern: Retina-Uveitis follow-up    Interval History of Present Ocular Illness:  Lc Hernandez is a 66 year old patient who returns for follow up of his choroidal neovascularization, ocular hypertension, posterior uveitis of the left eye.  He was last seen on June 7, 2021 at which time there was no recurrence of the CNV at 8 weeks from the prior Avastin injection.  We discussed treatment on extension versus observation of the CNV and elected to observe without Avastin and return for a visit today. We recommended continued use of Cosopt twice a day in the left eye to protect peripheral vision, but no steroid drops nor oral steroid given no recurrence of the posterior uveitis in the left eye. Since then, Mr. Hernandez reports things are doing well without any changes.     Interval Updates to Medical/Family/Social History:  No health changes    Relevant Review of Systems Updates:  No coughs/cold, no headaches.      Current eye related medications: AREDS 2 vitamins, Cosopt 2x/day left eye     Retina/Uveitis Imaging:   OCT Spectralis Macula July 19, 2021  right eye: Drusenoid PEDs, no fluid. Stable   left eye: Drusen, no recurrence of PED. Thin choroid    Assessment:     1. Choroidal retinal neovascularization of left eye  No recurrence now 3 months after last injection of Avastin    2. Posterior uveitis, left eye  Doing well without recurrence    3. Ocular hypertension, left eye  IOP 19 on Cosopt 2x/day left eye     4. Intermediate stage dry age-related macular degeneration of both eyes  Drusenoid changes right eye, no recurrence of CNV in the left eye     Plan/Recommendations:      Discussed findings  with patient. There is no recurrence of the CNV in the left eye, so no Avastin injection needed in the left eye     The right eye remains without signs of exudation    Eye pressure is normal in the right eye and controlled in the left eye on Cosopt 2x/day    Do not recommend additional diagnostic testing    Continue Cosopt 2x/day in the left eye and AREDS 2 vitamins    No steroid drops or oral prednisone needed as no recurrence of the uveitis in the left eye     RTC 6-8 weeks tonopen, dilate left eye, OCT (Ordered), ?Avastin left eye     Physician Attestation     Attending Physician Attestation:  Complete documentation of historical and exam elements from today's encounter can be found in the full encounter summary report (not reduplicated in this progress note). I personally obtained the chief complaint(s) and history of present illness. I confirmed and edited as necessary the review of systems, past medical/surgical history, family history, social history, and examination findings as documented by others; and I examined the patient myself. I personally reviewed the relevant tests, images, and reports as documented above. I formulated and edited as necessary the assessment and plan and discussed the findings and management plan with the patient and family members present at the time of this visit.  Nba Mcnally M.D., Uveitis and Medical Retina, July 19, 2021     Again, thank you for allowing me to participate in the care of your patient.    Sincerely,    bNa Mcnally MD  [facility] Dept of Ophthalmology  Uveitis and Medical Retina

## 2021-09-13 ENCOUNTER — OFFICE VISIT (OUTPATIENT)
Dept: OPHTHALMOLOGY | Facility: CLINIC | Age: 66
End: 2021-09-13
Attending: OPHTHALMOLOGY
Payer: MEDICARE

## 2021-09-13 DIAGNOSIS — H35.3132 INTERMEDIATE STAGE DRY AGE-RELATED MACULAR DEGENERATION OF BOTH EYES: ICD-10-CM

## 2021-09-13 DIAGNOSIS — H30.92 POSTERIOR UVEITIS, LEFT EYE: ICD-10-CM

## 2021-09-13 DIAGNOSIS — H40.052 OCULAR HYPERTENSION, LEFT EYE: ICD-10-CM

## 2021-09-13 DIAGNOSIS — H35.052 CHOROIDAL RETINAL NEOVASCULARIZATION OF LEFT EYE: Primary | ICD-10-CM

## 2021-09-13 PROCEDURE — 99213 OFFICE O/P EST LOW 20 MIN: CPT | Mod: GC | Performed by: OPHTHALMOLOGY

## 2021-09-13 PROCEDURE — 92134 CPTRZ OPH DX IMG PST SGM RTA: CPT | Performed by: OPHTHALMOLOGY

## 2021-09-13 PROCEDURE — G0463 HOSPITAL OUTPT CLINIC VISIT: HCPCS

## 2021-09-13 RX ORDER — DORZOLAMIDE HYDROCHLORIDE AND TIMOLOL MALEATE 20; 5 MG/ML; MG/ML
1 SOLUTION/ DROPS OPHTHALMIC 2 TIMES DAILY
Qty: 10 ML | Refills: 6 | Status: SHIPPED | OUTPATIENT
Start: 2021-09-13 | End: 2021-12-13

## 2021-09-13 ASSESSMENT — VISUAL ACUITY
OS_CC+: -2
OS_CC: 20/25 ECC
OD_CC: 20/20
OD_CC+: -1
METHOD: SNELLEN - LINEAR
CORRECTION_TYPE: GLASSES

## 2021-09-13 ASSESSMENT — TONOMETRY
OS_IOP_MMHG: 19
IOP_METHOD: TONOPEN
OD_IOP_MMHG: 20

## 2021-09-13 ASSESSMENT — CUP TO DISC RATIO
OD_RATIO: 0.4
OS_RATIO: 0.8

## 2021-09-13 ASSESSMENT — CONF VISUAL FIELD
METHOD: COUNTING FINGERS
OD_NORMAL: 1
OS_NORMAL: 1

## 2021-09-13 ASSESSMENT — REFRACTION_WEARINGRX
OD_SPHERE: -5.50
OD_ADD: +2.25
OD_CYLINDER: +2.50
OS_ADD: +2.25
OS_SPHERE: -6.25
SPECS_TYPE: PAL
OD_AXIS: 001
OS_AXIS: 100
OS_CYLINDER: +1.75

## 2021-09-13 ASSESSMENT — SLIT LAMP EXAM - LIDS
COMMENTS: MILD MGD
COMMENTS: MILD MGD

## 2021-09-13 ASSESSMENT — EXTERNAL EXAM - RIGHT EYE: OD_EXAM: NORMAL

## 2021-09-13 ASSESSMENT — EXTERNAL EXAM - LEFT EYE: OS_EXAM: NORMAL

## 2021-09-13 NOTE — LETTER
9/13/2021       RE: Lc Hernandez  [address] [address] [id number]     Dear Colleague,    Thank you for referring your patient, Lc Hernandez, to the [facility] EYE CLINIC at [facility]. Please see a copy of my visit note below.    Chief Complaint/Presenting Concern:  Retina-Uveitis Follow up    Interval History of Present Ocular Illness:  Lc Hernandez is a 66 year old patient who returns for follow up of his choroidal neovascularization of the left eye.  He was last seen in July 19, 2021 at which time there was no recurrence of the CNV 3 months after the prior Avastin injection, and also no recurrence of the posterior uveitis in the left eye.  Recommended observing the left eye without Avastin injection and to follow-up today.  Also recommended continuing Cosopt in the left eye and AREDS 2 Vitamins.     Since, then Mr. Hernandez denies vision changes,photopsias, floaters, curtains/veils, metamorphopsia, diplopia. Denies glare, haloes around lights, sunbursts.     Interval Updates to Medical/Family/Social History:  No new medical problems    Relevant Review of Systems Updates:  No coughs/colds      Current eye related medications: Cosopt 2-0.5% left eye twice daily, AREDS 2 Vitamins    Retina/Uveitis Imaging:   OCT Spectralis Macula September 13, 2021  right eye: ERM, drusen, no fluid.   left eye: ERM, drusenoid changes, no CNV recurrences.    Assessment:     1. Choroidal retinal neovascularization of left eye  No recurrence now 5 months after last injection of Avastin, no symptoms, and no CNV    2. Posterior uveitis, left eye  Doing well without recurrence    3. Ocular hypertension, left eye  IOP 19 on Cosopt 2x/day left eye     4. Intermediate stage dry age-related macular degeneration of both eyes  Drusenoid changes right eye, no recurrence of CNV in the left eye    Plan/Recommendations:      Discussed findings with patient. There is no recurrence of  the uveitis in the left eye and no recurrence of the CNV. As such, no steorid drops needed and no Avastin injection needed in the left eye.     There are no signs of macular exudation in the right eye.     Eye pressure is controlled    Continue current medications: Cosopt 2-0.5% left eye twice daily, AREDS 2 Vitamins    No new medications    RTC 3 months tonopen, dilate left eye, OCT and RNFL ?Avastin    Dylan , DO   PGY-5 Neuro-Ophthalmology Fellow'    Attending Physician Attestation:  Complete documentation of historical and exam elements from today's encounter can be found in the full encounter summary report (not reduplicated in this progress note). I reviewed the chief complaint(s) and history of present illness, and  confirmed and edited as necessary the review of systems, past medical/surgical history, family history, social history, and examination findings as documented by others and the treating Resident or Fellow Physician.    I examined the patient myself, discussed the findings, reviewed all ancillary testing data and modified these results and reports along with the assessment and plan with the Treating Resident or Fellow Physician. I agree with the note as detailed above.   Nba Mcnally M.D.  Uveitis and Medical Retina  September 13, 2021    Again, thank you for allowing me to participate in the care of your patient.    Sincerely,    Nba Mcnally MD  [facility] Dept of Ophthalmology  Uveitis and Medical Retina

## 2021-09-13 NOTE — PROGRESS NOTES
Chief Complaint/Presenting Concern:  Retina-Uveitis Follow up    Interval History of Present Ocular Illness:  Lc Hernandez is a 66 year old patient who returns for follow up of his choroidal neovascularization of the left eye.  He was last seen in July 19, 2021 at which time there was no recurrence of the CNV 3 months after the prior Avastin injection, and also no recurrence of the posterior uveitis in the left eye.  Recommended observing the left eye without Avastin injection and to follow-up today.  Also recommended continuing Cosopt in the left eye and AREDS 2 Vitamins.     Since, then Mr. Hernandez denies vision changes,photopsias, floaters, curtains/veils, metamorphopsia, diplopia. Denies glare, haloes around lights, sunbursts.     Interval Updates to Medical/Family/Social History:  No new medical problems    Relevant Review of Systems Updates:  No coughs/colds      Current eye related medications: Cosopt 2-0.5% left eye twice daily, AREDS 2 Vitamins    Retina/Uveitis Imaging:   OCT Spectralis Macula September 13, 2021  right eye: ERM, drusen, no fluid.   left eye: ERM, drusenoid changes, no CNV recurrences.    Assessment:     1. Choroidal retinal neovascularization of left eye  No recurrence now 5 months after last injection of Avastin, no symptoms, and no CNV    2. Posterior uveitis, left eye  Doing well without recurrence    3. Ocular hypertension, left eye  IOP 19 on Cosopt 2x/day left eye     4. Intermediate stage dry age-related macular degeneration of both eyes  Drusenoid changes right eye, no recurrence of CNV in the left eye    Plan/Recommendations:      Discussed findings with patient. There is no recurrence of the uveitis in the left eye and no recurrence of the CNV. As such, no steorid drops needed and no Avastin injection needed in the left eye.     There are no signs of macular exudation in the right eye.     Eye pressure is controlled    Continue current medications: Cosopt 2-0.5% left eye twice  daily, AREDS 2 Vitamins    No new medications    RTC 3 months tonopen, dilate left eye, OCT and RNFL ?Avastin    Dylan , DO   PGY-5 Neuro-Ophthalmology Fellow'    Attending Physician Attestation:  Complete documentation of historical and exam elements from today's encounter can be found in the full encounter summary report (not reduplicated in this progress note). I reviewed the chief complaint(s) and history of present illness, and  confirmed and edited as necessary the review of systems, past medical/surgical history, family history, social history, and examination findings as documented by others and the treating Resident or Fellow Physician.    I examined the patient myself, discussed the findings, reviewed all ancillary testing data and modified these results and reports along with the assessment and plan with the Treating Resident or Fellow Physician. I agree with the note as detailed above.   Nba Mcnally M.D.  Uveitis and Medical Retina  September 13, 2021

## 2021-09-13 NOTE — NURSING NOTE
Chief Complaints and History of Present Illnesses   Patient presents with     Uveitis Follow-Up     Chief Complaint(s) and History of Present Illness(es)     Uveitis Follow-Up     Laterality: left eye    Onset: months ago    Quality: States va is the same since last visit      Severity: moderate    Frequency: intermittently    Associated symptoms: Negative for floaters, flashes and photophobia    Treatments tried: eye drops    Pain scale: 0/10              Comments     Here for Choroidal retinal neovascularization of left eye   Cosopt 2x/day in the left eye   Rand Zapata COT 9:35 AM September 13, 2021

## 2021-10-11 ENCOUNTER — HEALTH MAINTENANCE LETTER (OUTPATIENT)
Age: 66
End: 2021-10-11

## 2021-12-13 ENCOUNTER — OFFICE VISIT (OUTPATIENT)
Dept: OPHTHALMOLOGY | Facility: CLINIC | Age: 66
End: 2021-12-13
Attending: OPHTHALMOLOGY
Payer: MEDICARE

## 2021-12-13 DIAGNOSIS — H40.052 OCULAR HYPERTENSION, LEFT EYE: ICD-10-CM

## 2021-12-13 DIAGNOSIS — H35.052 CHOROIDAL RETINAL NEOVASCULARIZATION OF LEFT EYE: Primary | ICD-10-CM

## 2021-12-13 DIAGNOSIS — H35.3132 INTERMEDIATE STAGE DRY AGE-RELATED MACULAR DEGENERATION OF BOTH EYES: ICD-10-CM

## 2021-12-13 DIAGNOSIS — H30.92 POSTERIOR UVEITIS, LEFT EYE: ICD-10-CM

## 2021-12-13 PROCEDURE — 99214 OFFICE O/P EST MOD 30 MIN: CPT | Performed by: OPHTHALMOLOGY

## 2021-12-13 PROCEDURE — G0463 HOSPITAL OUTPT CLINIC VISIT: HCPCS

## 2021-12-13 PROCEDURE — 92134 CPTRZ OPH DX IMG PST SGM RTA: CPT | Performed by: OPHTHALMOLOGY

## 2021-12-13 PROCEDURE — 92133 CPTRZD OPH DX IMG PST SGM ON: CPT | Performed by: OPHTHALMOLOGY

## 2021-12-13 RX ORDER — DORZOLAMIDE HYDROCHLORIDE AND TIMOLOL MALEATE 20; 5 MG/ML; MG/ML
1 SOLUTION/ DROPS OPHTHALMIC 2 TIMES DAILY
Qty: 10 ML | Refills: 6 | Status: SHIPPED | OUTPATIENT
Start: 2021-12-13 | End: 2022-05-09

## 2021-12-13 ASSESSMENT — CONF VISUAL FIELD
METHOD: COUNTING FINGERS
OS_NORMAL: 1
OD_NORMAL: 1

## 2021-12-13 ASSESSMENT — REFRACTION_WEARINGRX
OS_CYLINDER: +1.75
OS_ADD: +2.25
OD_AXIS: 001
OS_SPHERE: -6.25
OS_AXIS: 100
OD_CYLINDER: +2.50
OD_SPHERE: -5.50
OD_ADD: +2.25
SPECS_TYPE: PAL

## 2021-12-13 ASSESSMENT — CUP TO DISC RATIO
OD_RATIO: 0.4
OS_RATIO: 0.8

## 2021-12-13 ASSESSMENT — VISUAL ACUITY
OS_CC: 20/30
METHOD: SNELLEN - LINEAR
CORRECTION_TYPE: GLASSES
OD_CC: 20/20

## 2021-12-13 ASSESSMENT — TONOMETRY
IOP_METHOD: TONOPEN
OD_IOP_MMHG: 20
OS_IOP_MMHG: 20

## 2021-12-13 ASSESSMENT — EXTERNAL EXAM - LEFT EYE: OS_EXAM: NORMAL

## 2021-12-13 ASSESSMENT — SLIT LAMP EXAM - LIDS
COMMENTS: MILD MGD
COMMENTS: MILD MGD

## 2021-12-13 ASSESSMENT — EXTERNAL EXAM - RIGHT EYE: OD_EXAM: NORMAL

## 2021-12-13 NOTE — NURSING NOTE
Chief Complaints and History of Present Illnesses   Patient presents with     Retinal Neovascularization Follow Up     Chief Complaint(s) and History of Present Illness(es)     Retinal Neovascularization Follow Up     Laterality: left eye    Onset: gradual    Onset: months ago    Quality: States the va is ok      Severity: moderate    Frequency: intermittently    Associated symptoms: Negative for floaters, flashes and photophobia    Treatments tried: eye drops    Pain scale: 0/10              Comments     Here for Choroidal retinal neovascularization of left eye  Cosopt  left eye twice daily  AREDS 2   Rand Zapata COT 8:36 AM December 13, 2021

## 2021-12-13 NOTE — LETTER
12/13/2021       RE: Lc Hernandez  [address] [address] [address] [facility] [id number]     Dear Colleague,    Thank you for referring your patient, Lc Hernandez, to the [facility] EYE CLINIC at [facility]. Please see a copy of my visit note below.    Chief Complaint/Presenting Concern: Uveitis follow-up    Interval History of Present Ocular Illness:  Lc Hernandez is a 66 year old patient who returns for follow up of his choroidal neovascular membrane of the left eye.  He was last seen on September 13, 2021 at which time the CNV in the left eye remained inactive and there was no recurrence of the posterior uveitis.  Recommended continued use of Cosopt twice daily in the left eye and AREDS vitamins but no injection but plans to follow-up today.    In the interim, Mr. Hernandez reports things are doing generally well. No waviness.    Interval Updates to Medical/Family/Social History:    No major health changes other than some teeth issues getting evaluated by a few dentists. Did well with Flu shot, COVID booster, and Shingles vaccine.     Relevant Review of Systems Updates:  Tooth issues as above     Current eye related medications: AREDS 2 Vitamins, Cosopt 2x/day left eye only    Retina/Uveitis Imaging:   OCT Spectralis Macula December 13, 2021  right eye: Drusen, no fluid. Stable   left eye: Slightly larger subfoveal PED without fluid, stable drusen    OCT Optic Nerve RNFL Spectralis December 13, 2021  right eye: Avg thickness 78 microns, some variability, with some borderline changes  left eye: Avg thickness 49 microns, stable thin superior and inferior    Assessment:     1. Choroidal retinal neovascularization of left eye  No symptoms, no blood but only subtle PED.    2. Posterior uveitis, left eye  Remains inactive.     3. Ocular hypertension, left eye  Controlled on Cosopt 2x/day. RNFL stable    4. Intermediate stage dry age-related macular degeneration of both  eyes  Right eye stable     Plan/Recommendations:      Discussed findings with patient. There are no symptoms but a subtle elevation of the PED without fluid. We discussed observation vs Avastin in the left eye, we elected to observe today but return with sooner interval    The uveitis has not recurred, so we can monitor without Durezol    Eye pressure is 20 in each eye on Cosopt 2x/day left eye only    Continue AREDS 2 vitamins and Cosopt 2x/day left eye only     Return for 6-8 weeks tonopen, dilate OS, OCT (ordered), ?Avastin.     Physician Attestation     Attending Physician Attestation:  Complete documentation of historical and exam elements from today's encounter can be found in the full encounter summary report (not reduplicated in this progress note). I personally obtained the chief complaint(s) and history of present illness. I confirmed and edited as necessary the review of systems, past medical/surgical history, family history, social history, and examination findings as documented by others; and I examined the patient myself. I personally reviewed the relevant tests, images, and reports as documented above. I formulated and edited as necessary the assessment and plan and discussed the findings and management plan with the patient and family members present at the time of this visit.  Nba Mcnally M.D., Uveitis and Medical Retina, December 13, 2021     Sincerely,    Nba Mcnally MD  [facility] Dept of Ophthalmology  Uveitis and Medical Retina

## 2021-12-13 NOTE — PROGRESS NOTES
Chief Complaint/Presenting Concern: Uveitis follow-up    Interval History of Present Ocular Illness:  Lc Hernandez is a 66 year old patient who returns for follow up of his choroidal neovascular membrane of the left eye.  He was last seen on September 13, 2021 at which time the CNV in the left eye remained inactive and there was no recurrence of the posterior uveitis.  Recommended continued use of Cosopt twice daily in the left eye and AREDS vitamins but no injection but plans to follow-up today.    In the interim, Mr. Hernandez reports things are doing generally well. No waviness.    Interval Updates to Medical/Family/Social History:    No major health changes other than some teeth issues getting evaluated by a few Dentists. Did well with Flu Shot, COVID booster, and Shingles vaccine.     Relevant Review of Systems Updates:  Tooth issues as above     Current eye related medications: AREDS 2 Vitamins, Cosopt 2x/day left eye only    Retina/Uveitis Imaging:   OCT Spectralis Macula December 13, 2021  right eye: Drusen, no fluid. Stable   left eye: Slightly larger subfoveal PED without fluid, stable drusen    OCT Optic Nerve RNFL Spectralis December 13, 2021  right eye: Avg thickness 78 microns, some variability, with some borderline changes  left eye: Avg thickness 49 microns, stable thin superior and inferior    Assessment:     1. Choroidal retinal neovascularization of left eye  No symptoms, no blood but only subtle PED.    2. Posterior uveitis, left eye  Remains inactive.     3. Ocular hypertension, left eye  Controlled on Cosopt 2x/day. RNFL stable    4. Intermediate stage dry age-related macular degeneration of both eyes  Right eye stable     Plan/Recommendations:      Discussed findings with patient. There are no symptoms but a subtle elevation of the PED without fluid. We discussed observation vs Avastin in the left eye, we elected to observe today but return with sooner interval    The uveitis has not recurred, so  we can monitor without Durezol    Eye pressure is 20 in each eye on Cosopt 2x/day left eye only    Continue AREDS 2 vitamins and Cosopt 2x/day left eye only     Return for 6-8 weeks tonopen, dilate OS, OCT (ordered), ?Avastin.     Physician Attestation     Attending Physician Attestation:  Complete documentation of historical and exam elements from today's encounter can be found in the full encounter summary report (not reduplicated in this progress note). I personally obtained the chief complaint(s) and history of present illness. I confirmed and edited as necessary the review of systems, past medical/surgical history, family history, social history, and examination findings as documented by others; and I examined the patient myself. I personally reviewed the relevant tests, images, and reports as documented above. I formulated and edited as necessary the assessment and plan and discussed the findings and management plan with the patient and family members present at the time of this visit.  Nba Mcnally M.D., Uveitis and Medical Retina, December 13, 2021

## 2022-01-30 ENCOUNTER — HEALTH MAINTENANCE LETTER (OUTPATIENT)
Age: 67
End: 2022-01-30

## 2022-02-07 ENCOUNTER — OFFICE VISIT (OUTPATIENT)
Dept: OPHTHALMOLOGY | Facility: CLINIC | Age: 67
End: 2022-02-07
Attending: OPHTHALMOLOGY
Payer: MEDICARE

## 2022-02-07 DIAGNOSIS — H40.052 OCULAR HYPERTENSION, LEFT EYE: ICD-10-CM

## 2022-02-07 DIAGNOSIS — H30.92 POSTERIOR UVEITIS, LEFT EYE: ICD-10-CM

## 2022-02-07 DIAGNOSIS — H35.3132 INTERMEDIATE STAGE DRY AGE-RELATED MACULAR DEGENERATION OF BOTH EYES: ICD-10-CM

## 2022-02-07 DIAGNOSIS — H35.052 CHOROIDAL RETINAL NEOVASCULARIZATION OF LEFT EYE: Primary | ICD-10-CM

## 2022-02-07 PROCEDURE — G0463 HOSPITAL OUTPT CLINIC VISIT: HCPCS | Mod: 25

## 2022-02-07 PROCEDURE — 92134 CPTRZ OPH DX IMG PST SGM RTA: CPT | Performed by: OPHTHALMOLOGY

## 2022-02-07 PROCEDURE — 99213 OFFICE O/P EST LOW 20 MIN: CPT | Performed by: OPHTHALMOLOGY

## 2022-02-07 ASSESSMENT — REFRACTION_WEARINGRX
OS_SPHERE: -6.25
OD_SPHERE: -5.50
SPECS_TYPE: PAL
OD_ADD: +2.25
OD_CYLINDER: +2.50
OD_AXIS: 001
OS_CYLINDER: +1.75
OS_AXIS: 100
OS_ADD: +2.25

## 2022-02-07 ASSESSMENT — CONF VISUAL FIELD
OD_NORMAL: 1
OS_NORMAL: 1

## 2022-02-07 ASSESSMENT — EXTERNAL EXAM - LEFT EYE: OS_EXAM: NORMAL

## 2022-02-07 ASSESSMENT — EXTERNAL EXAM - RIGHT EYE: OD_EXAM: NORMAL

## 2022-02-07 ASSESSMENT — VISUAL ACUITY
CORRECTION_TYPE: GLASSES
METHOD: SNELLEN - LINEAR
OS_CC+: -2
OD_CC+: -1
OD_CC: 20/20
OS_CC: 20/25

## 2022-02-07 ASSESSMENT — TONOMETRY
OD_IOP_MMHG: 18
IOP_METHOD: TONOPEN
OS_IOP_MMHG: 24

## 2022-02-07 ASSESSMENT — CUP TO DISC RATIO
OS_RATIO: 0.8
OD_RATIO: 0.4

## 2022-02-07 ASSESSMENT — SLIT LAMP EXAM - LIDS
COMMENTS: MILD MGD
COMMENTS: MILD MGD

## 2022-02-07 NOTE — PROGRESS NOTES
Chief Complaint/Presenting Concern:  Retina-Uveitis follow up    Interval History of Present Ocular Illness:  Lc Hernandze is a 67 year old patient who returns for follow up of his choroidal neovascularization of the left eye.  He was last seen on December 13, 2021 at which time the CNV in the left eye remained inactive in addition to the uveitis.  We recommended continued monitoring of the uveitis without Durezol and observing the subtle PED in the left eye. Overall, no changes, no waviness.    Interval Updates to Medical/Family/Social History:    Still awaiting on tooth issues    Relevant Review of Systems Updates:  No changes      Current eye related medications: AREDS 2 vitamins, Cosopt 2 times a day left eye    Retina/Uveitis Imaging:   OCT Spectralis Macula February 7, 2022  right eye: Drusenoid PEDs, no fluid. Stable.  Peripapillary drusen but no peripapillary fluid  left eye: Drusen, stable subtle pigment epithelial detachment at the fovea without fluid, thin choroid.  Stable.  Peripapillary atrophy and cupping but no fluid    Assessment:     1. Choroidal retinal neovascularization of left eye  Remains inactive now 10 months after last Avastin injection.    2. Posterior uveitis, left eye  Remains inactive    3. Ocular hypertension, left eye  Slightly elevated, although no Cosopt    4. Intermediate stage dry age-related macular degeneration of both eyes  Stable drusen in the right eye     Plan/Recommendations:      Discussed findings with patient. The CNV Remains inactive now 10 months after the last Avastin injection. Given no symptoms, no blood on exam, no change on OCT, we elected to observe without Avastin injection in the left eye     Eye pressure is normal right eye and slightly elevated left eye (although did not use drops in the left eye this morning).  Given that the optic nerve RNFL scan was stable last visit we can continue the Cosopt drops twice a day in the left eye only    No Avastin injection  today    Continue these medications:Cosopt 2x/day in the left eye, AREDS 2 vitamins    No Durezol drops in the left eye     No new medications needed    RTC 3 months tonopen, dilate left eye, RNFL, OCT, ?Avastin OS    Physician Attestation     Attending Physician Attestation:  Complete documentation of historical and exam elements from today's encounter can be found in the full encounter summary report (not reduplicated in this progress note). I personally obtained the chief complaint(s) and history of present illness. I confirmed and edited as necessary the review of systems, past medical/surgical history, family history, social history, and examination findings as documented by others; and I examined the patient myself. I personally reviewed the relevant tests, images, and reports as documented above. I formulated and edited as necessary the assessment and plan and discussed the findings and management plan with the patient and family members present at the time of this visit.  Nba Mcnally M.D., Uveitis and Medical Retina, February 7, 2022

## 2022-02-07 NOTE — PATIENT INSTRUCTIONS
No Avastin injection today    Continue these medications:Cosopt 2x/day in the left eye, AREDS 2 vitamins    No Durezol drops in the left eye     No new medications needed

## 2022-02-07 NOTE — NURSING NOTE
Chief Complaints and History of Present Illnesses   Patient presents with     Uveitis Follow-Up     Chief Complaint(s) and History of Present Illness(es)     Uveitis Follow-Up     Laterality: left eye    Onset: 1 month ago              Comments     Pt. States that he is doing well. No change in VA BE. No pain or dryness BE. No flashes or floaters BE.   Taking Cosopt BID LE. Hasn't used this morning yet.   Lurdes Bonilla COT 8:28 AM February 7, 2022

## 2022-02-07 NOTE — LETTER
2/7/2022        RE: Lc Hernandez  [address] [facility] [id number]     Dear Colleague,    Thank you for referring your patient, Lc Hernandez, to the [facility] [place] at [facility]. Please see a copy of my visit note below.    Chief Complaint/Presenting Concern:  Retina-Uveitis follow up    Interval History of Present Ocular Illness:  Lc Hernandez is a 67 year old patient who returns for follow up of his choroidal neovascularization of the left eye.  He was last seen on December 13, 2021 at which time the CNV in the left eye remained inactive in addition to the uveitis.  We recommended continued monitoring of the uveitis without Durezol and observing the subtle PED in the left eye. Overall, no changes, no waviness.    Interval Updates to Medical/Family/Social History:    Still awaiting on tooth issues    Relevant Review of Systems Updates:  No changes      Current eye related medications: AREDS 2 vitamins, Cosopt 2 times a day left eye    Retina/Uveitis Imaging:   OCT Spectralis Macula February 7, 2022  right eye: Drusenoid PEDs, no fluid. Stable.  Peripapillary drusen but no peripapillary fluid  left eye: Drusen, stable subtle pigment epithelial detachment at the fovea without fluid, thin choroid.  Stable.  Peripapillary atrophy and cupping but no fluid    Assessment:     1. Choroidal retinal neovascularization of left eye  Remains inactive now 10 months after last Avastin injection.    2. Posterior uveitis, left eye  Remains inactive    3. Ocular hypertension, left eye  Slightly elevated, although no Cosopt    4. Intermediate stage dry age-related macular degeneration of both eyes  Stable drusen in the right eye     Plan/Recommendations:      Discussed findings with patient. The CNV Remains inactive now 10 months after the last Avastin injection. Given no symptoms, no blood on exam, no change on OCT, we elected to observe without  Avastin injection in the left eye     Eye pressure is normal right eye and slightly elevated left eye (although did not use drops in the left eye this morning).  Given that the optic nerve RNFL scan was stable last visit we can continue the Cosopt drops twice a day in the left eye only    No Avastin injection today    Continue these medications:Cosopt 2x/day in the left eye, AREDS 2 vitamins    No Durezol drops in the left eye     No new medications needed    RTC 3 months tonopen, dilate left eye, RNFL, OCT, ?Avastin OS    Physician Attestation     Attending Physician Attestation:  Complete documentation of historical and exam elements from today's encounter can be found in the full encounter summary report (not reduplicated in this progress note). I personally obtained the chief complaint(s) and history of present illness. I confirmed and edited as necessary the review of systems, past medical/surgical history, family history, social history, and examination findings as documented by others; and I examined the patient myself. I personally reviewed the relevant tests, images, and reports as documented above. I formulated and edited as necessary the assessment and plan and discussed the findings and management plan with the patient and family members present at the time of this visit.  Nba Mcnally M.D., Uveitis and Medical Retina, February 7, 2022     Sincerely,    Nba Mcnally MD  [facility] Dept of Ophthalmology  Uveitis and Medical Retina

## 2022-02-17 PROBLEM — Z79.899 HIGH RISK MEDICATION USE: Status: ACTIVE | Noted: 2020-07-31

## 2022-02-17 PROBLEM — H35.352 CYSTOID MACULAR EDEMA, LEFT EYE: Status: ACTIVE | Noted: 2020-07-31

## 2022-02-17 PROBLEM — H30.92: Status: ACTIVE | Noted: 2020-07-31

## 2022-05-09 ENCOUNTER — OFFICE VISIT (OUTPATIENT)
Dept: OPHTHALMOLOGY | Facility: CLINIC | Age: 67
End: 2022-05-09
Attending: OPHTHALMOLOGY
Payer: MEDICARE

## 2022-05-09 DIAGNOSIS — H35.3132 INTERMEDIATE STAGE DRY AGE-RELATED MACULAR DEGENERATION OF BOTH EYES: ICD-10-CM

## 2022-05-09 DIAGNOSIS — H40.052 OCULAR HYPERTENSION, LEFT EYE: ICD-10-CM

## 2022-05-09 DIAGNOSIS — H30.92 POSTERIOR UVEITIS, LEFT EYE: ICD-10-CM

## 2022-05-09 DIAGNOSIS — H35.052 CHOROIDAL RETINAL NEOVASCULARIZATION OF LEFT EYE: Primary | ICD-10-CM

## 2022-05-09 PROCEDURE — G0463 HOSPITAL OUTPT CLINIC VISIT: HCPCS

## 2022-05-09 PROCEDURE — 99213 OFFICE O/P EST LOW 20 MIN: CPT | Performed by: OPHTHALMOLOGY

## 2022-05-09 PROCEDURE — 99207 OCT OPTIC NERVE RNFL SPECTRALIS OU (BOTH EYES): CPT | Mod: 26 | Performed by: OPHTHALMOLOGY

## 2022-05-09 PROCEDURE — 92134 CPTRZ OPH DX IMG PST SGM RTA: CPT | Performed by: OPHTHALMOLOGY

## 2022-05-09 PROCEDURE — 92133 CPTRZD OPH DX IMG PST SGM ON: CPT | Performed by: OPHTHALMOLOGY

## 2022-05-09 RX ORDER — DORZOLAMIDE HYDROCHLORIDE AND TIMOLOL MALEATE 20; 5 MG/ML; MG/ML
1 SOLUTION/ DROPS OPHTHALMIC 2 TIMES DAILY
Qty: 10 ML | Refills: 6 | Status: SHIPPED | OUTPATIENT
Start: 2022-05-09 | End: 2022-11-07

## 2022-05-09 ASSESSMENT — REFRACTION_WEARINGRX
OD_AXIS: 005
OS_SPHERE: -6.00
OS_AXIS: 110
OD_SPHERE: -5.50
OS_CYLINDER: +1.50
OD_CYLINDER: +2.50

## 2022-05-09 ASSESSMENT — TONOMETRY
IOP_METHOD: TONOPEN
OD_IOP_MMHG: 18
OS_IOP_MMHG: 20

## 2022-05-09 ASSESSMENT — CONF VISUAL FIELD
OS_NORMAL: 1
OD_NORMAL: 1
METHOD: COUNTING FINGERS

## 2022-05-09 ASSESSMENT — VISUAL ACUITY
OD_CC+: +2
OD_CC: 20/25
METHOD: SNELLEN - LINEAR
OS_CC: 20/25
CORRECTION_TYPE: GLASSES

## 2022-05-09 ASSESSMENT — CUP TO DISC RATIO
OS_RATIO: 0.8
OD_RATIO: 0.4

## 2022-05-09 ASSESSMENT — EXTERNAL EXAM - LEFT EYE: OS_EXAM: NORMAL

## 2022-05-09 ASSESSMENT — SLIT LAMP EXAM - LIDS
COMMENTS: MILD MGD
COMMENTS: MILD MGD

## 2022-05-09 ASSESSMENT — EXTERNAL EXAM - RIGHT EYE: OD_EXAM: NORMAL

## 2022-05-09 NOTE — PROGRESS NOTES
Chief Complaint/Presenting Concern: Retina follow-up    Interval History of Present Ocular Illness:  Lc Hernandez is a 67 year old patient who returns for follow up of his choroidal neovascularization of the left eye.  He was last seen on February 7, 2022 at which time the CNV in the left eye remained inactive 10 months after the prior Avastin injection.  The posterior uveitis in the left eye also remained inactive.  Recommended continued use of Cosopt 2 times a day in the left eye without the need for Durezol in the left eye nor any Avastin injection.    Since last visit, Mr. Hrenandez reports things are doing well with new glasses.     Interval Updates to Medical/Family/Social History:  No changes    Relevant Review of Systems Updates:  No health changes.      Current eye related medications: Cosopt 2 times a day left eye, AREDS Vitamins    Retina/Uveitis Imaging:   OCT Spectralis Macula May 9, 2022  right eye: Drusen, no fluid, thin choroid.  Stable  left eye: Stable subfoveal PED, no fluid, stable drusen, thin choroid.  Stable    OCT Optic Nerve RNFL Spectralis May 9, 2022  right eye: Average thickness 78  m, stable borderline thin temporal and superonasal.  left eye: Average thickness 48  m, stable thinning superior and inferior    Assessment:     1. Choroidal retinal neovascularization of left eye  Inactive by OCT over 1 year since last Avastin injection    2. Posterior uveitis, left eye  Remains inactive    3. Ocular hypertension, left eye  IOP 20 on Cosopt 2x/day. RNFL without progression, suggesting adequate eye pressure control    4. Intermediate stage dry age-related macular degeneration of both eyes  Stable in both eyes    Plan/Recommendations:      Discussed findings with patient.  There is no recurrence of the choroidal neovascularization of the left eye, so this can be monitored without any Avastin injection    Eye pressure is normal right eye and controlled left eye. Optic nerve scan stable in each eye.      No additional lab testing recommended    Continue these medications: Cosopt 2 times a day left eye and AREDS vitamins    No Durezol drops needed left eye as there is no recurrence of the posterior uveitis    RTC Return for 6 months, Tonopen, dilate OS, OCT (ordered), RNFL (ordered).     Physician Attestation     Attending Physician Attestation:  Complete documentation of historical and exam elements from today's encounter can be found in the full encounter summary report (not reduplicated in this progress note). I personally obtained the chief complaint(s) and history of present illness. I confirmed and edited as necessary the review of systems, past medical/surgical history, family history, social history, and examination findings as documented by others; and I examined the patient myself. I personally reviewed the relevant tests, images, and reports as documented above. I formulated and edited as necessary the assessment and plan and discussed the findings and management plan with the patient and family members present at the time of this visit.  Nba Mcnally M.D., Uveitis and Medical Retina, May 9, 2022

## 2022-05-09 NOTE — NURSING NOTE
Chief Complaints and History of Present Illnesses   Patient presents with     Macular Degeneration Follow Up     Chief Complaint(s) and History of Present Illness(es)     Macular Degeneration Follow Up     Laterality: both eyes    Onset: gradual    Onset: months ago    Quality: States the va is doing good    Severity: moderate    Frequency: intermittently    Associated symptoms: Negative for photophobia, flashes and floaters    Pain scale: 0/10              Comments     Here for Choroidal retinal neovascularization of left eye   Cosopt drops twice a day left eye   AREDS 2  Rand Zapata COT 8:45 AM May 9, 2022

## 2022-05-09 NOTE — PATIENT INSTRUCTIONS
Let us know if the wavy lines come back in the left eye  Continue the eye pressure drop 2x/day in the left eye

## 2022-05-09 NOTE — LETTER
5/9/2022       RE: Lc Hernandez  [address] [facility] [place] [address]     Dear Colleague,    Thank you for referring your patient, Lc Hernandez, to the [facility] [place] at [facility]. Please see a copy of my visit note below.    Chief Complaint/Presenting Concern: Retina follow-up.    Interval History of Present Ocular Illness:  Lc Hernandez is a 67 year old patient who returns for follow up of his choroidal neovascularization of the left eye.  He was last seen on February 7, 2022 at which time the CNV in the left eye remained inactive 10 months after the prior Avastin injection.  The posterior uveitis in the left eye also remained inactive.  Recommended continued use of Cosopt 2 times a day in the left eye without the need for Durezol in the left eye nor any Avastin injection.    Since last visit, Mr. Hernandez reports things are doing well with new glasses.     Interval Updates to Medical/Family/Social History:  No changes.    Relevant Review of Systems Updates:  No health changes.      Current eye related medications: Cosopt 2 times a day left eye, AREDS Vitamins.    Retina/Uveitis Imaging:   OCT Spectralis Macula May 9, 2022  right eye: Drusen, no fluid, thin choroid.  Stable  left eye: Stable subfoveal PED, no fluid, stable drusen, thin choroid.  Stable    OCT Optic Nerve RNFL Spectralis May 9, 2022  right eye: Average thickness 78  m, stable borderline thin temporal and superonasal.  left eye: Average thickness 48  m, stable thinning superior and inferior  Assessment:     1. Choroidal retinal neovascularization of left eye  Inactive by OCT over 1 year since last Avastin injection.    2. Posterior uveitis, left eye  Remains inactive.    3. Ocular hypertension, left eye  IOP 20 on Cosopt 2x/day. RNFL without progression, suggesting adequate eye pressure control.    4. Intermediate stage dry age-related macular degeneration of both  eyes  Stable in both eyes.    Plan/Recommendations:      Discussed findings with patient.  There is no recurrence of the choroidal neovascularization of the left eye, so this can be monitored without any Avastin injection.    Eye pressure is normal right eye and controlled left eye. Optic nerve scan stable in each eye.     No additional lab testing recommended.    Continue these medications: Cosopt 2 times a day left eye and AREDS vitamins.    No Durezol drops needed left eye as there is no recurrence of the posterior uveitis.    RTC Return for 6 months, Tonopen, dilate OS, OCT (ordered), RNFL (ordered).     Attending Physician Attestation:  Complete documentation of historical and exam elements from today's encounter can be found in the full encounter summary report (not reduplicated in this progress note). I personally obtained the chief complaint(s) and history of present illness. I confirmed and edited as necessary the review of systems, past medical/surgical history, family history, social history, and examination findings as documented by others; and I examined the patient myself. I personally reviewed the relevant tests, images, and reports as documented above. I formulated and edited as necessary the assessment and plan and discussed the findings and management plan with the patient and family members present at the time of this visit.  Nba Mcnally M.D., Uveitis and Medical Retina, May 9, 2022     Again, thank you for allowing me to participate in the care of your patient.    Sincerely,    Nba Mcnally MD  [facility] Dept of Ophthalmology  Uveitis and Medical Retina

## 2022-09-24 ENCOUNTER — HEALTH MAINTENANCE LETTER (OUTPATIENT)
Age: 67
End: 2022-09-24

## 2022-11-07 ENCOUNTER — OFFICE VISIT (OUTPATIENT)
Dept: OPHTHALMOLOGY | Facility: CLINIC | Age: 67
End: 2022-11-07
Attending: OPHTHALMOLOGY
Payer: MEDICARE

## 2022-11-07 DIAGNOSIS — H35.3132 INTERMEDIATE STAGE DRY AGE-RELATED MACULAR DEGENERATION OF BOTH EYES: ICD-10-CM

## 2022-11-07 DIAGNOSIS — H30.92 POSTERIOR UVEITIS, LEFT EYE: Primary | ICD-10-CM

## 2022-11-07 DIAGNOSIS — H35.052 CHOROIDAL RETINAL NEOVASCULARIZATION OF LEFT EYE: ICD-10-CM

## 2022-11-07 DIAGNOSIS — H40.052 OCULAR HYPERTENSION, LEFT EYE: ICD-10-CM

## 2022-11-07 PROCEDURE — 99207 OCT OPTIC NERVE RNFL SPECTRALIS OU (BOTH EYES): CPT | Mod: 26 | Performed by: OPHTHALMOLOGY

## 2022-11-07 PROCEDURE — 92133 CPTRZD OPH DX IMG PST SGM ON: CPT | Performed by: OPHTHALMOLOGY

## 2022-11-07 PROCEDURE — 92134 CPTRZ OPH DX IMG PST SGM RTA: CPT | Performed by: OPHTHALMOLOGY

## 2022-11-07 PROCEDURE — 99213 OFFICE O/P EST LOW 20 MIN: CPT | Performed by: OPHTHALMOLOGY

## 2022-11-07 PROCEDURE — G0463 HOSPITAL OUTPT CLINIC VISIT: HCPCS | Mod: 25

## 2022-11-07 RX ORDER — DORZOLAMIDE HYDROCHLORIDE AND TIMOLOL MALEATE 20; 5 MG/ML; MG/ML
1 SOLUTION/ DROPS OPHTHALMIC 2 TIMES DAILY
Qty: 10 ML | Refills: 6 | Status: SHIPPED | OUTPATIENT
Start: 2022-11-07 | End: 2024-02-05

## 2022-11-07 ASSESSMENT — REFRACTION_WEARINGRX
OS_CYLINDER: +1.50
OD_SPHERE: -5.50
OS_SPHERE: -6.00
OS_AXIS: 110
OD_CYLINDER: +2.50
OD_AXIS: 005

## 2022-11-07 ASSESSMENT — CONF VISUAL FIELD
OS_INFERIOR_TEMPORAL_RESTRICTION: 0
OD_SUPERIOR_TEMPORAL_RESTRICTION: 0
METHOD: COUNTING FINGERS
OS_SUPERIOR_NASAL_RESTRICTION: 0
OD_NORMAL: 1
OS_SUPERIOR_TEMPORAL_RESTRICTION: 0
OD_INFERIOR_NASAL_RESTRICTION: 0
OS_INFERIOR_NASAL_RESTRICTION: 0
OD_INFERIOR_TEMPORAL_RESTRICTION: 0
OS_NORMAL: 1
OD_SUPERIOR_NASAL_RESTRICTION: 0

## 2022-11-07 ASSESSMENT — TONOMETRY
OD_IOP_MMHG: 12
OS_IOP_MMHG: 10
IOP_METHOD: TONOPEN

## 2022-11-07 ASSESSMENT — VISUAL ACUITY
CORRECTION_TYPE: GLASSES
OS_CC+: -2
METHOD: SNELLEN - LINEAR
OS_CC: 20/25
OD_CC: 20/20

## 2022-11-07 ASSESSMENT — CUP TO DISC RATIO
OD_RATIO: 0.4
OS_RATIO: 0.8

## 2022-11-07 ASSESSMENT — SLIT LAMP EXAM - LIDS
COMMENTS: MILD MGD
COMMENTS: MILD MGD

## 2022-11-07 ASSESSMENT — EXTERNAL EXAM - RIGHT EYE: OD_EXAM: NORMAL

## 2022-11-07 ASSESSMENT — EXTERNAL EXAM - LEFT EYE: OS_EXAM: NORMAL

## 2022-11-07 NOTE — PROGRESS NOTES
Chief Complaint/Presenting Concern:  Uveitis follow up    Interval History of Present Ocular Illness:  Lc Hernandez is a 67 year old patient who returns for follow up of his Choroidal neovascularziation of the left eye. Things were doing well at last visit, so we elected to observe without Avastin.    Mr. Hernandez reports things are doing well. There is a new floater in the right eye but no other issues. No floaters left eye.     Interval Updates to Medical/Family/Social History:    Had 5th COVID Vaccine    Relevant Review of Systems Updates:  No health issues.      Current eye related medications: AREDS 2 vitamins, Cosopt 2x/day in the left eye     Retina/Uveitis Imaging:   OCT Spectralis Macula November 7, 2022  right eye: Drusen, no fluid. Stable   left eye: Drusen, subfoveal material is smaller, no fluid. Stable     OCT Optic Nerve RNFL Spectralis November 7, 2022  right eye: Avg thickness 80 microns, stable temporal thinning  left eye: Avg thickness 47 microns, stable thin superior and inferior    Assessment:     1. Posterior uveitis, left eye  Remains inactive    2. Choroidal retinal neovascularization of left eye  Remains inactive    3. Intermediate stage dry age-related macular degeneration of both eyes  Stable without exudation in either eye    4. Ocular hypertension, left eye  Well controlled on Cosopt 2x/day    Plan/Recommendations:      Discussed findings with patient. The uveitis remains inactive in the left eye and the CNV remains inactive. No Avastin injection needed in the left eye and no steroid drops needed.     The macular degeneration is also stable in each eye     Eye pressure is 12,10. Optic nerve scans stable    Continue these medications:AREDS 2 vitamins, Cosopt 2x/day in the left eye     No other treatments needed    RTC 6 months applanate, dilate left eye, OCT, RNFL (ordered) ?Avastin left eye     Physician Attestation     Attending Physician Attestation:  Complete documentation of historical  and exam elements from today's encounter can be found in the full encounter summary report (not reduplicated in this progress note). I personally obtained the chief complaint(s) and history of present illness. I confirmed and edited as necessary the review of systems, past medical/surgical history, family history, social history, and examination findings as documented by others; and I examined the patient myself. I personally reviewed the relevant tests, images, and reports as documented above. I formulated and edited as necessary the assessment and plan and discussed the findings and management plan with the patient and family members present at the time of this visit.  Nba Mcnally M.D., Uveitis and Medical Retina, November 7, 2022

## 2022-11-07 NOTE — NURSING NOTE
Chief Complaints and History of Present Illnesses   Patient presents with     Retinal Neovascularization Follow Up     Chief Complaint(s) and History of Present Illness(es)     Retinal Neovascularization Follow Up            Laterality: left eye    Onset: gradual    Onset: months ago    Quality: States va is the same since last visit      Severity: moderate    Frequency: intermittently    Associated symptoms: floaters (in the OD but only noticing when looking at something white).  Negative for photophobia and flashes    Treatments tried: eye drops    Pain scale: 0/10          Comments    Here for Choroidal retinal neovascularization of left eye   Cosopt 2 times a day left eye  AREMARISELA Zapata COT 8:39 AM November 7, 2022

## 2022-11-07 NOTE — LETTER
11/7/2022       RE: Lc Hernandez  [address] [facility] [id number]     Dear Colleague,    Thank you for referring your patient, Lc Hernandez, to the [facility] [place] at [facility]. Please see a copy of my visit note below.    Chief Complaint/Presenting Concern:  Uveitis follow up    Interval History of Present Ocular Illness:  Lc Hernandez is a 67 year old patient who returns for follow up of his Choroidal neovascularziation of the left eye. Things were doing well at last visit, so we elected to observe without Avastin.    Mr. Hernandez reports things are doing well. There is a new floater in the right eye but no other issues. No floaters left eye.     Interval Updates to Medical/Family/Social History:    Had 5th COVID Vaccine    Relevant Review of Systems Updates:  No health issues.      Current eye related medications: AREDS 2 vitamins, Cosopt 2x/day in the left eye     Retina/Uveitis Imaging:   OCT Spectralis Macula November 7, 2022  right eye: Drusen, no fluid. Stable   left eye: Drusen, subfoveal material is smaller, no fluid. Stable     OCT Optic Nerve RNFL Spectralis November 7, 2022  right eye: Avg thickness 80 microns, stable temporal thinning  left eye: Avg thickness 47 microns, stable thin superior and inferior      Assessment:     1. Posterior uveitis, left eye  Remains inactive    2. Choroidal retinal neovascularization of left eye  Remains inactive    3. Intermediate stage dry age-related macular degeneration of both eyes  Stable without exudation in either eye    4. Ocular hypertension, left eye  Well controlled on Cosopt 2x/day    Plan/Recommendations:      Discussed findings with patient. The uveitis remains inactive in the left eye and the CNV remains inactive. No Avastin injection needed in the left eye and no steroid drops needed.     The macular degeneration is also stable in each eye     Eye pressure is 12,10. Optic  nerve scans stable    Continue these medications:AREDS 2 vitamins, Cosopt 2x/day in the left eye     No other treatments needed    RTC 6 months applanate, dilate left eye, OCT, RNFL (ordered) ?Avastin left eye     Physician Attestation     Attending Physician Attestation:  Complete documentation of historical and exam elements from today's encounter can be found in the full encounter summary report (not reduplicated in this progress note). I personally obtained the chief complaint(s) and history of present illness. I confirmed and edited as necessary the review of systems, past medical/surgical history, family history, social history, and examination findings as documented by others; and I examined the patient myself. I personally reviewed the relevant tests, images, and reports as documented above. I formulated and edited as necessary the assessment and plan and discussed the findings and management plan with the patient and family members present at the time of this visit.  Nba Mcnally M.D., Uveitis and Medical Retina, November 7, 2022     Again, thank you for allowing me to participate in the care of your patient.      Sincerely,    Nba Mcnally MD  [facility] Dept of Ophthalmology  Uveitis and Medical Retina

## 2022-11-07 NOTE — PATIENT INSTRUCTIONS
Continue these medications:AREDS 2 vitamins, Cosopt 2x/day in the left eye   No other treatments needed  Take care of yourself and your Wife!

## 2023-01-29 ENCOUNTER — HEALTH MAINTENANCE LETTER (OUTPATIENT)
Age: 68
End: 2023-01-29

## 2023-05-01 ENCOUNTER — OFFICE VISIT (OUTPATIENT)
Dept: OPHTHALMOLOGY | Facility: CLINIC | Age: 68
End: 2023-05-01
Attending: OPHTHALMOLOGY
Payer: MEDICARE

## 2023-05-01 DIAGNOSIS — H30.92 POSTERIOR UVEITIS, LEFT EYE: ICD-10-CM

## 2023-05-01 DIAGNOSIS — H40.052 OCULAR HYPERTENSION, LEFT EYE: ICD-10-CM

## 2023-05-01 DIAGNOSIS — H35.052 CHOROIDAL RETINAL NEOVASCULARIZATION OF LEFT EYE: Primary | ICD-10-CM

## 2023-05-01 DIAGNOSIS — H35.3132 INTERMEDIATE STAGE DRY AGE-RELATED MACULAR DEGENERATION OF BOTH EYES: ICD-10-CM

## 2023-05-01 PROCEDURE — 99214 OFFICE O/P EST MOD 30 MIN: CPT | Performed by: OPHTHALMOLOGY

## 2023-05-01 PROCEDURE — G0463 HOSPITAL OUTPT CLINIC VISIT: HCPCS | Performed by: OPHTHALMOLOGY

## 2023-05-01 PROCEDURE — 92133 CPTRZD OPH DX IMG PST SGM ON: CPT | Performed by: OPHTHALMOLOGY

## 2023-05-01 ASSESSMENT — REFRACTION_WEARINGRX
OS_AXIS: 110
OD_SPHERE: -5.50
OS_CYLINDER: +1.50
OS_SPHERE: -6.00
OD_CYLINDER: +2.50
OD_AXIS: 005

## 2023-05-01 ASSESSMENT — SLIT LAMP EXAM - LIDS
COMMENTS: MILD MGD
COMMENTS: MILD MGD

## 2023-05-01 ASSESSMENT — CONF VISUAL FIELD
OD_SUPERIOR_TEMPORAL_RESTRICTION: 0
OD_SUPERIOR_NASAL_RESTRICTION: 0
OD_INFERIOR_NASAL_RESTRICTION: 0
OS_INFERIOR_NASAL_RESTRICTION: 0
OD_NORMAL: 1
OS_SUPERIOR_NASAL_RESTRICTION: 0
OS_SUPERIOR_TEMPORAL_RESTRICTION: 0
OD_INFERIOR_TEMPORAL_RESTRICTION: 0
OS_INFERIOR_TEMPORAL_RESTRICTION: 0
OS_NORMAL: 1

## 2023-05-01 ASSESSMENT — TONOMETRY
OD_IOP_MMHG: 16
OS_IOP_MMHG: 15
IOP_METHOD: APPLANATION

## 2023-05-01 ASSESSMENT — EXTERNAL EXAM - LEFT EYE: OS_EXAM: NORMAL

## 2023-05-01 ASSESSMENT — CUP TO DISC RATIO
OD_RATIO: 0.4
OS_RATIO: 0.8

## 2023-05-01 ASSESSMENT — EXTERNAL EXAM - RIGHT EYE: OD_EXAM: NORMAL

## 2023-05-01 ASSESSMENT — VISUAL ACUITY
CORRECTION_TYPE: GLASSES
OD_CC: 20/20
OS_CC+: -1
METHOD: SNELLEN - LINEAR
OS_CC: 20/25

## 2023-05-01 NOTE — LETTER
5/1/2023       RE: Lc Hernandez  [address] [facility] [address]     Dear Colleague,    Thank you for referring your patient, Lc Hernandez, to the [facility] [place] at [facility]. Please see a copy of my visit note below.    Chief Complaint/Presenting Concern:  Uveitis evaluation    Interval History of Present Ocular Illness:  Lc Hernandez is a 68 year old patient who returns for follow up of his CNV in the left eye. Things were doing well last in November. No issues since. Floaters occasionally    Interval Updates to Medical/Family/Social History:    Did well with COVID vaccine #5    Relevant Review of Systems Updates:  No issues     Current eye related medications: AREDS 2 Vitamins, Cosopt 2x/day in left eye only     Retina/Uveitis Imaging:   OCT Spectralis Macula May 1, 2023  right eye: Drusen, no fluid  left eye: No new PED under fovea, drusen stable, no fluid    OCT Optic Nerve RNFL Spectralis May 1, 2023  right eye: Avg thickness 78 microns, some interval borderline sup temp and and inf temp. Generally stable  left eye: Avg thickness 47 microns, stable thin superior and inferior, no new thinning    Assessment:     1. Choroidal retinal neovascularization of left eye  Remains inactive now 2 years after last Avastin injection      2. Posterior uveitis, left eye  No new spots    3. Ocular hypertension, left eye  Controlled on Cosopt 2x/day with stable optic nerve scan.    4. Intermediate stage dry age-related macular degeneration of both eyes  Stable drusen    Plan/Recommendations:    Discussed findings with patient.  There is no recurrence of the choroidal neovascular membrane in the left eye nor the the uveitis.  We can continue to monitor without steroid drops nor Avastin injection for the left eye.   The macular degeneration is stable in both eyes without progression to the advanced form in either eye.  We can continue taking  eye vitamins  Eye pressure is 16,15.  Optic nerve scans are stable in both eyes, we can continue the Cosopt twice a day in the left eye only  Continue these medications: AREDS 2 Vitamins, Cosopt 2x/day in left eye only   No other treatments needed    Return for 9 months, Tonopen, Dilate OS only, OCT,RNFL, Optos Photos (ordered).     Physician Attestation     Attending Physician Attestation:  Complete documentation of historical and exam elements from today's encounter can be found in the full encounter summary report (not reduplicated in this progress note). I personally obtained the chief complaint(s) and history of present illness. I confirmed and edited as necessary the review of systems, past medical/surgical history, family history, social history, and examination findings as documented by others; and I examined the patient myself. I personally reviewed the relevant tests, images, and reports as documented above. I formulated and edited as necessary the assessment and plan and discussed the findings and management plan with the patient and family members present at the time of this visit.  Nba Mcnally M.D., Uveitis and Medical Retina, May 1, 2023     Again, thank you for allowing me to participate in the care of your patient.      Sincerely,    Nba Mcnally MD  [facility] Dept of Ophthalmology  Uveitis and Medical Retina

## 2023-05-01 NOTE — PROGRESS NOTES
Chief Complaint/Presenting Concern:  Uveitis evaluation    Interval History of Present Ocular Illness:  Lc Hernandez is a 68 year old patient who returns for follow up of his CNV in the left eye. Things were doing well last in November. No issues since. Floaters occasionally    Interval Updates to Medical/Family/Social History:    Did well with COVID vaccine #5    Relevant Review of Systems Updates:  No issues     Current eye related medications: AREDS 2 Vitamins, Cosopt 2x/day in left eye only     Retina/Uveitis Imaging:   OCT Spectralis Macula May 1, 2023  right eye: Drusen, no fluid  left eye: No new PED under fovea, drusen stable, no fluid    OCT Optic Nerve RNFL Spectralis May 1, 2023  right eye: Avg thickness 78 microns, some interval borderline sup temp and and inf temp. Generally stable  left eye: Avg thickness 47 microns, stable thin superior and inferior, no new thinning    Assessment:     1. Choroidal retinal neovascularization of left eye  Remains inactive now 2 years after last Avastin injection    2. Posterior uveitis, left eye  No new spots    3. Ocular hypertension, left eye  Controlled on Cosopt 2x/day with stable optic nerve scan.    4. Intermediate stage dry age-related macular degeneration of both eyes  Stable drusen    Plan/Recommendations:      Discussed findings with patient.  There is no recurrence of the choroidal neovascular membrane in the left eye nor the the uveitis.  We can continue to monitor without steroid drops nor Avastin injection for the left eye.     The macular degeneration is stable in both eyes without progression to the advanced form in either eye.  We can continue taking eye vitamins    Eye pressure is 16,15.  Optic nerve scans are stable in both eyes, we can continue the Cosopt twice a day in the left eye only    Continue these medications: AREDS 2 Vitamins, Cosopt 2x/day in left eye only     No other treatments needed    Return for 9 months, Tonopen, Dilate OS only,  OCT,RNFL, Optos Photos (ordered).     Physician Attestation     Attending Physician Attestation:  Complete documentation of historical and exam elements from today's encounter can be found in the full encounter summary report (not reduplicated in this progress note). I personally obtained the chief complaint(s) and history of present illness. I confirmed and edited as necessary the review of systems, past medical/surgical history, family history, social history, and examination findings as documented by others; and I examined the patient myself. I personally reviewed the relevant tests, images, and reports as documented above. I formulated and edited as necessary the assessment and plan and discussed the findings and management plan with the patient and family members present at the time of this visit.  Nba Mcnally M.D., Uveitis and Medical Retina, May 1, 2023

## 2023-05-01 NOTE — PATIENT INSTRUCTIONS
Things are doing well, no injection!!  Keep up the eye vitamin and Cosopt drop left eye Only  Have a great summer playing golf!

## 2023-05-01 NOTE — NURSING NOTE
Chief Complaints and History of Present Illnesses   Patient presents with     Uveitis Follow-Up     Chief Complaint(s) and History of Present Illness(es)     Uveitis Follow-Up            Laterality: left eye    Onset: gradual    Onset: months ago    Severity: moderate    Frequency: intermittently    Pain scale: 0/10          Comments    Here for Posterior uveitis, left eye-Pt. States that he is doing well. No change in VA BE. No pain BE.  AREDS 2   Cosopt 2x/day in the left eye.  Lurdes Bonilla COT 8:42 AM May 1, 2023

## 2023-08-02 ENCOUNTER — OFFICE VISIT (OUTPATIENT)
Dept: OPHTHALMOLOGY | Facility: CLINIC | Age: 68
End: 2023-08-02
Attending: OPHTHALMOLOGY
Payer: MEDICARE

## 2023-08-02 DIAGNOSIS — H35.052 CHOROIDAL RETINAL NEOVASCULARIZATION OF LEFT EYE: ICD-10-CM

## 2023-08-02 DIAGNOSIS — H35.3132 INTERMEDIATE STAGE DRY AGE-RELATED MACULAR DEGENERATION OF BOTH EYES: Primary | ICD-10-CM

## 2023-08-02 DIAGNOSIS — H30.92 POSTERIOR UVEITIS, LEFT EYE: ICD-10-CM

## 2023-08-02 DIAGNOSIS — H40.052 OCULAR HYPERTENSION, LEFT EYE: ICD-10-CM

## 2023-08-02 PROCEDURE — G0463 HOSPITAL OUTPT CLINIC VISIT: HCPCS | Performed by: OPHTHALMOLOGY

## 2023-08-02 PROCEDURE — 92134 CPTRZ OPH DX IMG PST SGM RTA: CPT | Performed by: OPHTHALMOLOGY

## 2023-08-02 PROCEDURE — 92133 CPTRZD OPH DX IMG PST SGM ON: CPT | Performed by: OPHTHALMOLOGY

## 2023-08-02 PROCEDURE — 92250 FUNDUS PHOTOGRAPHY W/I&R: CPT | Mod: XU | Performed by: OPHTHALMOLOGY

## 2023-08-02 PROCEDURE — 99207 FUNDUS PHOTOS OU (BOTH EYES): CPT | Mod: 26 | Performed by: OPHTHALMOLOGY

## 2023-08-02 PROCEDURE — 99213 OFFICE O/P EST LOW 20 MIN: CPT | Performed by: OPHTHALMOLOGY

## 2023-08-02 ASSESSMENT — VISUAL ACUITY
OD_CC: 20/20
OS_CC+: -2
OS_CC: 20/20
OD_CC+: -1
METHOD: SNELLEN - LINEAR
CORRECTION_TYPE: GLASSES

## 2023-08-02 ASSESSMENT — TONOMETRY
OD_IOP_MMHG: 15
IOP_METHOD: TONOPEN
OS_IOP_MMHG: 14

## 2023-08-02 ASSESSMENT — CUP TO DISC RATIO
OD_RATIO: 0.4
OS_RATIO: 0.8

## 2023-08-02 ASSESSMENT — REFRACTION_WEARINGRX
OS_SPHERE: -6.00
OS_CYLINDER: +1.50
OS_AXIS: 110
OD_AXIS: 005
OD_SPHERE: -5.50
OD_CYLINDER: +2.50

## 2023-08-02 ASSESSMENT — SLIT LAMP EXAM - LIDS
COMMENTS: MILD MGD
COMMENTS: MILD MGD

## 2023-08-02 ASSESSMENT — EXTERNAL EXAM - RIGHT EYE: OD_EXAM: NORMAL

## 2023-08-02 ASSESSMENT — EXTERNAL EXAM - LEFT EYE: OS_EXAM: NORMAL

## 2023-08-02 NOTE — NURSING NOTE
Chief Complaints and History of Present Illnesses   Patient presents with    Uveitis Follow-Up     Follow up Choroidal retinal neovascularization, left eye . Last seen May 1, 2023.      Chief Complaint(s) and History of Present Illness(es)       Uveitis Follow-Up              Laterality: both eyes    Associated symptoms: Negative for double vision, flashes and floaters    Treatments tried: eye drops    Pain scale: 0/10    Comments: Follow up Choroidal retinal neovascularization, left eye . Last seen May 1, 2023.               Comments    Patient reports new wavy lines in central vision of right eye, subtle appearance. First started 3 weeks ago, respectively.   Left eye appears stable, pt is pleased with how visual blind spots have improved and lines appear straight.   Denies double vision/ new floaters/ flashes. BE   Cosopt BID CARLY , lgtts 9:30 am     HILDA SIMEON, August 2, 2023

## 2023-08-02 NOTE — PATIENT INSTRUCTIONS
Continue Cosopt (Dorzolamide-Timolol) 2x/day left eye only  Continue AREDS 2 vitamins  Let us know if things changes

## 2023-08-02 NOTE — PROGRESS NOTES
Chief Complaint/Presenting Concern:  Uveitis follow up    Interval History of Present Ocular Illness:  Lc Hernandez is a 68 year old patient who returns for changes to the vision in the right eye noted when looking at his neighbor's house and he noted waviness of the shingles. This is not noticed when looking at the amsler gid.  Left eye fine    Interval Updates to Medical/Family/Social History:  Wife's health doing better now    Relevant Review of Systems Updates:  Health doing well.      Current eye related medications: Cosopt 2x/day left eye only, AREDS 2 vitamins    Retina/Uveitis Imaging:   OCT Spectralis Macula August 2, 2023  right eye: Drusen/drusenoid PEDS, no fluid.   left eye: Focal drusenoid changes, no fluid    RNFL August 2, 2023  Right eye: Avg thickness 77 microns, stable borderline superior, temp, inf temp. Stable  Left eye:  Avg thickness 45 microns, stable thin superior and inferior    Optos Fundus Photos August 2, 2023:  Right eye: Drusen  Left eye: Hypopigmented spots perhaps more confluent nasal, cupping of disc    Assessment:     1. Intermediate stage dry age-related macular degeneration of both eyes  Drusenoid changes but no exudation in the right eye    2. Choroidal retinal neovascularization of left eye  Remains inactive    3. Posterior uveitis, left eye  Remains inactive    4. Ocular hypertension, left eye  Controlled on Cosopt, RNFL stabe     Plan/Recommendations:    Discussed findings with patient.The changes in the waviness of the right eye is likely related to macular degeneration changes. There is  no sign of blood vessel changes (CNV) related to macular degeneration. This can be observed without Avastin injections  The left eye remains inactive and can be observed without injections  Eye pressure is controlled on drops left eye only and optic nerve scan stable.   Continue Cosopt (Dorzolamide-Timolol) 2x/day left eye only  Continue AREDS 2 vitamins  Let us know if things  changes    RTC Feb 2024 as scheduled    Physician Attestation     Attending Physician Attestation:  Complete documentation of historical and exam elements from today's encounter can be found in the full encounter summary report (not reduplicated in this progress note). I personally obtained the chief complaint(s) and history of present illness. I confirmed and edited as necessary the review of systems, past medical/surgical history, family history, social history, and examination findings as documented by others; and I examined the patient myself. I personally reviewed the relevant tests, images, and reports as documented above. I formulated and edited as necessary the assessment and plan and discussed the findings and management plan with the patient and family members present at the time of this visit.  Nba Mcnally M.D., Uveitis and Medical Retina, August 2, 2023

## 2023-08-02 NOTE — LETTER
8/2/2023       RE: Lc Hernandez  [address] [address] [address]     Dear Colleague,    Thank you for referring your patient, Lc Hernandez, to the [facility] [place] at [facility]. Please see a copy of my visit note below.    Chief Complaint/Presenting Concern:  Uveitis follow up.    Interval History of Present Ocular Illness:  Lc Hernandez is a 68 year old patient who returns for changes to the vision in the right eye noted when looking at his neighbor's house and he noted waviness of the shingles. This is not noticed when looking at the amsler gid.  Left eye fine.    Interval Updates to Medical/Family/Social History:  Wife's health doing better now.    Relevant Review of Systems Updates:  Health doing well.      Current eye related medications: Cosopt 2x/day left eye only, AREDS 2 vitamins.    Retina/Uveitis Imaging:   OCT Spectralis Macula August 2, 2023  right eye: Drusen/drusenoid PEDS, no fluid.   left eye: Focal drusenoid changes, no fluid    RNFL August 2, 2023  Right eye: Avg thickness 77 microns, stable borderline superior, temp, inf temp. Stable  Left eye:  Avg thickness 45 microns, stable thin superior and inferior    Optos Fundus Photos August 2, 2023:  Right eye: Drusen  Left eye: Hypopigmented spots perhaps more confluent nasal, cupping of disc    Assessment:     1. Intermediate stage dry age-related macular degeneration of both eyes  Drusenoid changes but no exudation in the right eye.    2. Choroidal retinal neovascularization of left eye  Remains inactive.    3. Posterior uveitis, left eye  Remains inactive.    4. Ocular hypertension, left eye  Controlled on Cosopt, RNFL stable.     Plan/Recommendations:    Discussed findings with patient.The changes in the waviness of the right eye is likely related to macular degeneration changes. There is  no sign of blood vessel changes (CNV) related to macular degeneration. This  can be observed without Avastin injections.  The left eye remains inactive and can be observed without injections.  Eye pressure is controlled on drops left eye only and optic nerve scan stable.   Continue Cosopt (Dorzolamide-Timolol) 2x/day left eye only.  Continue AREDS 2 vitamins.  Let us know if things changes.    RTC Feb 2024 as scheduled    Physician Attestation    Attending Physician Attestation:  Complete documentation of historical and exam elements from today's encounter can be found in the full encounter summary report (not reduplicated in this progress note). I personally obtained the chief complaint(s) and history of present illness. I confirmed and edited as necessary the review of systems, past medical/surgical history, family history, social history, and examination findings as documented by others; and I examined the patient myself. I personally reviewed the relevant tests, images, and reports as documented above. I formulated and edited as necessary the assessment and plan and discussed the findings and management plan with the patient and family members present at the time of this visit.  Nba Mcnally M.D., Uveitis and Medical Retina, August 2, 2023       Again, thank you for allowing me to participate in the care of your patient.      Sincerely,    Nba Mcnally MD  [facility] Dept of Ophthalmology  Uveitis and Medical Retina

## 2024-02-05 ENCOUNTER — OFFICE VISIT (OUTPATIENT)
Dept: OPHTHALMOLOGY | Facility: CLINIC | Age: 69
End: 2024-02-05
Attending: OPHTHALMOLOGY
Payer: MEDICARE

## 2024-02-05 DIAGNOSIS — H35.3132 INTERMEDIATE STAGE DRY AGE-RELATED MACULAR DEGENERATION OF BOTH EYES: Primary | ICD-10-CM

## 2024-02-05 DIAGNOSIS — H40.052 OCULAR HYPERTENSION, LEFT EYE: ICD-10-CM

## 2024-02-05 DIAGNOSIS — H30.92 POSTERIOR UVEITIS, LEFT EYE: ICD-10-CM

## 2024-02-05 DIAGNOSIS — H35.052 CHOROIDAL RETINAL NEOVASCULARIZATION OF LEFT EYE: ICD-10-CM

## 2024-02-05 PROCEDURE — G0463 HOSPITAL OUTPT CLINIC VISIT: HCPCS | Performed by: OPHTHALMOLOGY

## 2024-02-05 PROCEDURE — 92134 CPTRZ OPH DX IMG PST SGM RTA: CPT | Performed by: OPHTHALMOLOGY

## 2024-02-05 PROCEDURE — 99213 OFFICE O/P EST LOW 20 MIN: CPT | Performed by: OPHTHALMOLOGY

## 2024-02-05 RX ORDER — DORZOLAMIDE HYDROCHLORIDE AND TIMOLOL MALEATE 20; 5 MG/ML; MG/ML
1 SOLUTION/ DROPS OPHTHALMIC 2 TIMES DAILY
Qty: 10 ML | Refills: 6 | Status: SHIPPED | OUTPATIENT
Start: 2024-02-05

## 2024-02-05 RX ORDER — TAMSULOSIN HYDROCHLORIDE 0.4 MG/1
CAPSULE ORAL
COMMUNITY

## 2024-02-05 ASSESSMENT — VISUAL ACUITY
OD_CC: 20/20
METHOD: SNELLEN - LINEAR
OD_CC+: -2
CORRECTION_TYPE: GLASSES
OS_CC: 20/25
OS_CC+: -2

## 2024-02-05 ASSESSMENT — SLIT LAMP EXAM - LIDS
COMMENTS: MILD MGD
COMMENTS: MILD MGD

## 2024-02-05 ASSESSMENT — CUP TO DISC RATIO
OD_RATIO: 0.4
OS_RATIO: 0.8

## 2024-02-05 ASSESSMENT — CONF VISUAL FIELD
OS_INFERIOR_TEMPORAL_RESTRICTION: 0
OD_INFERIOR_TEMPORAL_RESTRICTION: 0
OS_SUPERIOR_TEMPORAL_RESTRICTION: 0
OS_NORMAL: 1
OS_SUPERIOR_NASAL_RESTRICTION: 0
OS_INFERIOR_NASAL_RESTRICTION: 0
OD_INFERIOR_NASAL_RESTRICTION: 0
OD_SUPERIOR_NASAL_RESTRICTION: 0
METHOD: COUNTING FINGERS
OD_SUPERIOR_TEMPORAL_RESTRICTION: 0
OD_NORMAL: 1

## 2024-02-05 ASSESSMENT — TONOMETRY
OS_IOP_MMHG: 20
OD_IOP_MMHG: 18
IOP_METHOD: TONOPEN

## 2024-02-05 ASSESSMENT — REFRACTION_WEARINGRX
OD_SPHERE: -5.50
OS_CYLINDER: +1.50
OS_SPHERE: -6.00
OD_CYLINDER: +2.50
OD_AXIS: 005
OS_AXIS: 110

## 2024-02-05 ASSESSMENT — EXTERNAL EXAM - LEFT EYE: OS_EXAM: NORMAL

## 2024-02-05 ASSESSMENT — EXTERNAL EXAM - RIGHT EYE: OD_EXAM: NORMAL

## 2024-02-05 NOTE — LETTER
2/5/2024       RE: Lc Hernandez  [address] [facility] [address] [address]     Dear Colleague,    Thank you for referring your patient, Lc Hernandez, to the [facility] [place] at [facility]. Please see a copy of my visit note below.    Chief Complaint/Presenting Concern:  Uveitis follow up    Interval History of Present Ocular Illness:  Lc Hernandez is a 69 year old patient who returns for follow up of his choroidal neovascularization.Last visit, we saw each other for changes in the right eye which was related to macular degeneration and no injections were recommended. The left eye was also stable.    Mr. Hernandez reports things are doing well. No major changes although wondering about getting new glasses.    Interval Updates to Medical/Family/Social History:    Wife recently got Pacemaker    Relevant Review of Systems Updates:  No health updates     Current eye related medications: Cosopt (Dorzolamide-Timolol) 2x/day left eye, AREDS 2 vitamins twice daily     Retina/Uveitis Imaging:   OCT Spectralis Macula February 5, 2024  right eye: Stable drusenoid changes, no fluid. No PP fluid  left eye: Drusenoid changes, no CNV recurrence, no fluid. No PP fluid    Assessment:     1. Intermediate stage dry age-related macular degeneration of both eyes  Stable without exudation.    2. Posterior uveitis, left eye  Remains inactive    3. Choroidal retinal neovascularization of left eye  No recurrence    4. Ocular hypertension, left eye  Controlled on Dorzolamide-Timolol 2x/day left eye     Plan/Recommendations:    Discussed findings with patient.The left eye is doing well without active exudation nearly 3 years since the last Avastin injection and can be monitored. The right eye is doing well also can be observed  Eye pressure is 18,20. We can continue the Dorzolamide-Timolol 2x/day in the left eye   Continue these medications:Cosopt (Dorzolamide-Timolol)  2x/day left eye, AREDS 2 vitamins twice daily   No injections today!    RTC December 2024 tonopen, OVF 10-2 left eye, Dilate left eye,OCT, RNFL (ordered both)    Physician Attestation    Attending Physician Attestation:  Complete documentation of historical and exam elements from today's encounter can be found in the full encounter summary report (not reduplicated in this progress note). I personally obtained the chief complaint(s) and history of present illness. I confirmed and edited as necessary the review of systems, past medical/surgical history, family history, social history, and examination findings as documented by others; and I examined the patient myself. I personally reviewed the relevant tests, images, and reports as documented above. I formulated and edited as necessary the assessment and plan and discussed the findings and management plan with the patient and family members present at the time of this visit.  Nba Mcnally M.D., Uveitis and Medical Retina, February 5, 2024     Again, thank you for allowing me to participate in the care of your patient.      Sincerely,    Nba Mcnally MD  [facility] Dept of Ophthalmology  Uveitis and Medical Retina

## 2024-02-05 NOTE — NURSING NOTE
Chief Complaints and History of Present Illnesses   Patient presents with    Macular Degeneration Follow Up     Chief Complaint(s) and History of Present Illness(es)       Macular Degeneration Follow Up              Laterality: both eyes    Onset: gradual    Onset: months ago    Quality: States the va is stable    Severity: moderate    Frequency: intermittently    Associated symptoms: Negative for photophobia, flashes and floaters    Treatments tried: eye drops    Pain scale: 0/10              Comments    Here for intermediate stage dry age-related macular degeneration of both eyes  Cosopt (Dorzolamide-Timolol) 2x/day left eye   AREDS 2  Rand Zapata COT 8:37 AM February 5, 2024

## 2024-02-05 NOTE — PATIENT INSTRUCTIONS
Continue these medications:Cosopt (Dorzolamide-Timolol) 2x/day left eye, AREDS 2 vitamins twice daily   No injections today!

## 2024-02-05 NOTE — PROGRESS NOTES
Chief Complaint/Presenting Concern:  Uveitis follow up    Interval History of Present Ocular Illness:  Lc Hernandez is a 69 year old patient who returns for follow up of his choroidal neovascularization.Last visit, we saw each other for changes in the right eye which was related to macular degeneration and no injections were recommended. The left eye was also stable.    Mr. Hernandez reports things are doing well. No major changes although wondering about getting new glasses.    Interval Updates to Medical/Family/Social History:    Wife recently got Pacemaker    Relevant Review of Systems Updates:  No health updates     Current eye related medications: Cosopt (Dorzolamide-Timolol) 2x/day left eye, AREDS 2 vitamins twice daily     Retina/Uveitis Imaging:   OCT Spectralis Macula February 5, 2024  right eye: Stable drusenoid changes, no fluid. No PP fluid  left eye: Drusenoid changes, no CNV recurrence, no fluid. No PP fluid    Assessment:     1. Intermediate stage dry age-related macular degeneration of both eyes  Stable without exudation.    2. Posterior uveitis, left eye  Remains inactive    3. Choroidal retinal neovascularization of left eye  No recurrence    4. Ocular hypertension, left eye  Controlled on Dorzolamide-Timolol 2x/day left eye     Plan/Recommendations:    Discussed findings with patient.The left eye is doing well without active exudation nearly 3 years since the last Avastin injection and can be monitored. The right eye is doing well also can be observed  Eye pressure is 18,20. We can continue the Dorzolamide-Timolol 2x/day in the left eye   Continue these medications:Cosopt (Dorzolamide-Timolol) 2x/day left eye, AREDS 2 vitamins twice daily   No injections today!    RTC December 2024 tonopen, OVF 10-2 left eye, Dilate left eye,OCT, RNFL (ordered both)    Physician Attestation     Attending Physician Attestation:  Complete documentation of historical and exam elements from today's encounter can be found  in the full encounter summary report (not reduplicated in this progress note). I personally obtained the chief complaint(s) and history of present illness. I confirmed and edited as necessary the review of systems, past medical/surgical history, family history, social history, and examination findings as documented by others; and I examined the patient myself. I personally reviewed the relevant tests, images, and reports as documented above. I formulated and edited as necessary the assessment and plan and discussed the findings and management plan with the patient and family members present at the time of this visit.  Nba Mcnally M.D., Uveitis and Medical Retina, February 5, 2024

## 2024-02-25 ENCOUNTER — HEALTH MAINTENANCE LETTER (OUTPATIENT)
Age: 69
End: 2024-02-25

## 2024-12-09 ENCOUNTER — OFFICE VISIT (OUTPATIENT)
Dept: OPHTHALMOLOGY | Facility: CLINIC | Age: 69
End: 2024-12-09
Attending: OPHTHALMOLOGY
Payer: MEDICARE

## 2024-12-09 DIAGNOSIS — H35.052 CHOROIDAL RETINAL NEOVASCULARIZATION OF LEFT EYE: ICD-10-CM

## 2024-12-09 DIAGNOSIS — H30.92 POSTERIOR UVEITIS, LEFT EYE: ICD-10-CM

## 2024-12-09 DIAGNOSIS — H35.3132 INTERMEDIATE STAGE DRY AGE-RELATED MACULAR DEGENERATION OF BOTH EYES: Primary | ICD-10-CM

## 2024-12-09 DIAGNOSIS — H40.052 OCULAR HYPERTENSION, LEFT EYE: ICD-10-CM

## 2024-12-09 PROCEDURE — 92082 INTERMEDIATE VISUAL FIELD XM: CPT | Performed by: OPHTHALMOLOGY

## 2024-12-09 PROCEDURE — G0463 HOSPITAL OUTPT CLINIC VISIT: HCPCS | Performed by: OPHTHALMOLOGY

## 2024-12-09 RX ORDER — DORZOLAMIDE HYDROCHLORIDE AND TIMOLOL MALEATE 20; 5 MG/ML; MG/ML
1 SOLUTION/ DROPS OPHTHALMIC 2 TIMES DAILY
Qty: 10 ML | Refills: 6 | Status: SHIPPED | OUTPATIENT
Start: 2024-12-09

## 2024-12-09 ASSESSMENT — VISUAL ACUITY
OS_CC: 20/25
METHOD: SNELLEN - LINEAR
OD_CC+: -1
CORRECTION_TYPE: GLASSES
OD_CC: 20/20
OS_CC+: -2

## 2024-12-09 ASSESSMENT — CONF VISUAL FIELD
OS_INFERIOR_TEMPORAL_RESTRICTION: 0
OD_SUPERIOR_NASAL_RESTRICTION: 0
OS_SUPERIOR_NASAL_RESTRICTION: 0
OD_SUPERIOR_TEMPORAL_RESTRICTION: 0
OD_INFERIOR_TEMPORAL_RESTRICTION: 0
METHOD: COUNTING FINGERS
OS_NORMAL: 1
OS_SUPERIOR_TEMPORAL_RESTRICTION: 0
OD_NORMAL: 1
OS_INFERIOR_NASAL_RESTRICTION: 0
OD_INFERIOR_NASAL_RESTRICTION: 0

## 2024-12-09 ASSESSMENT — REFRACTION_WEARINGRX
OS_SPHERE: -6.00
OD_SPHERE: -5.50
OD_AXIS: 005
OS_CYLINDER: +1.50
OS_AXIS: 110
OD_CYLINDER: +2.50

## 2024-12-09 ASSESSMENT — TONOMETRY
OD_IOP_MMHG: 20
OS_IOP_MMHG: 22
IOP_METHOD: TONOPEN

## 2024-12-09 ASSESSMENT — CUP TO DISC RATIO
OS_RATIO: 0.8
OD_RATIO: 0.4

## 2024-12-09 ASSESSMENT — EXTERNAL EXAM - LEFT EYE: OS_EXAM: NORMAL

## 2024-12-09 ASSESSMENT — SLIT LAMP EXAM - LIDS
COMMENTS: MILD MGD
COMMENTS: MILD MGD

## 2024-12-09 ASSESSMENT — EXTERNAL EXAM - RIGHT EYE: OD_EXAM: NORMAL

## 2024-12-09 NOTE — PATIENT INSTRUCTIONS
Continue Dorzolamide-Timolol 2x/day left eye and AREDS2 vitamins  No injections or other treatments needed  Have a great year!

## 2024-12-09 NOTE — LETTER
12/9/2024       RE: Lc Hernandez  [address] [facility] [id number]     Dear Colleague,    Thank you for referring your patient, Lc Hernandez, to the [facility] [place] at [facility]. Please see a copy of my visit note below.    Chief Complaint/Presenting Concern:  Retina follow up    Interval History of Present Ocular Illness:  Lc Hernandez is a 69 year old patient who returns for follow up of his macular degeneration. We last saw each other in February at which time all was stable.    Mr. Hernandez reports things have all been the same. No new changes centrally nor peripheral vision.     Interval Updates to Medical/Family/Social History:  Doing well    Relevant Review of Systems Updates:  No updates     Current eye related medications: Dorzolamide-Timolol 2x/day left eye, AREDS 2 vitamins    Retina/Uveitis Imaging:   Lilly VF 10-2 December 9, 2024  Left eye only: Improved paracentral inferior defect, some variation superior. MD improved to -12.01dB    Assessment:     1. Intermediate stage dry age-related macular degeneration of both eyes (Primary)  Drusen without fluid or hemorrhage in each eye     2. Choroidal retinal neovascularization of left eye  Remains inactive    3. Posterior uveitis, left eye  No haze, no new spots    4. Ocular hypertension, left eye  Just upper normal, visual field improved.     Plan/Recommendations:    Discussed findings with patient. There is no recurrent eye inflammation and no reactivation of the blood vessels in the left eye. Scans were not done due to technical issues, but stable on exam. We can monitor without injections.   Eye pressure is normal right eye and upper normal left eye. The visual field test shows improvement in the left eye and we can monitor on Dorzolamide 2x/day left eye   Continue to monitor without injections  Continue Dorzolamide-Timolol 2x/day left eye and AREDS2 vitamins    RTC 1  year tonopen, 10-2 left eye, dilate left eye only, RNFL and OCT (ordered)    Physician Attestation    Attending Physician Attestation:  Complete documentation of historical and exam elements from today's encounter can be found in the full encounter summary report (not reduplicated in this progress note). I personally obtained the chief complaint(s) and history of present illness. I confirmed and edited as necessary the review of systems, past medical/surgical history, family history, social history, and examination findings as documented by others; and I examined the patient myself. I personally reviewed the relevant tests, images, and reports as documented above. I formulated and edited as necessary the assessment and plan and discussed the findings and management plan with the patient and family members present at the time of this visit.  Nba Mcnally M.D., Uveitis and Medical Retina, December 9, 2024       Again, thank you for allowing me to participate in the care of your patient.      Sincerely,    Nba Mcnally MD  Uveitis and Medical Retina    Department of Ophthalmology & Visual Neurosciences  [facility]

## 2024-12-09 NOTE — PROGRESS NOTES
Chief Complaint/Presenting Concern:  Retina follow up    Interval History of Present Ocular Illness:  Lc Hernandez is a 69 year old patient who returns for follow up of his macular degeneration. We last saw each other in February at which time all was stable.    Mr. Hernandez reports things have all been the same. No new changes centrally nor peripheral vision.     Interval Updates to Medical/Family/Social History:  Doing well    Relevant Review of Systems Updates:  No updates     Current eye related medications: Dorzolamide-Timolol 2x/day left eye, AREDS 2 vitamins    Retina/Uveitis Imaging:   Lilly VF 10-2 December 9, 2024  Left eye only: Improved paracentral inferior defect, some variation superior. MD improved to -12.01dB    Assessment:     1. Intermediate stage dry age-related macular degeneration of both eyes (Primary)  Drusen without fluid or hemorrhage in each eye     2. Choroidal retinal neovascularization of left eye  Remains inactive    3. Posterior uveitis, left eye  No haze, no new spots    4. Ocular hypertension, left eye  Just upper normal, visual field improved.     Plan/Recommendations:    Discussed findings with patient. There is no recurrent eye inflammation and no reactivation of the blood vessels in the left eye. Scans were not done due to technical issues, but stable on exam. We can monitor without injections.   Eye pressure is normal right eye and upper normal left eye. The visual field test shows improvement in the left eye and we can monitor on Dorzolamide 2x/day left eye   Continue to monitor without injections  Continue Dorzolamide-Timolol 2x/day left eye and AREDS2 vitamins    RTC 1 year tonopen, 10-2 left eye, dilate left eye only, RNFL and OCT (ordered)    Physician Attestation     Attending Physician Attestation:  Complete documentation of historical and exam elements from today's encounter can be found in the full encounter summary report (not reduplicated in this progress note). I  personally obtained the chief complaint(s) and history of present illness. I confirmed and edited as necessary the review of systems, past medical/surgical history, family history, social history, and examination findings as documented by others; and I examined the patient myself. I personally reviewed the relevant tests, images, and reports as documented above. I formulated and edited as necessary the assessment and plan and discussed the findings and management plan with the patient and family members present at the time of this visit.  Nba Mcnally M.D., Uveitis and Medical Retina, December 9, 2024

## 2024-12-09 NOTE — NURSING NOTE
Chief Complaints and History of Present Illnesses   Patient presents with    Macular Degeneration Follow Up     Chief Complaint(s) and History of Present Illness(es)       Macular Degeneration Follow Up              Laterality: both eyes    Onset: gradual    Onset: months ago    Quality: States va is the same since last visit      Frequency: intermittently    Associated symptoms: photophobia.  Negative for flashes and floaters    Treatments tried: eye drops    Pain scale: 0/10              Comments    Here for intermediate stage dry age-related macular degeneration of both eyes   Dorzolamide-Timolol BID left eye   AREDs2  Rand Zapata COT 8:25 AM December 9, 2024

## 2025-03-15 ENCOUNTER — HEALTH MAINTENANCE LETTER (OUTPATIENT)
Age: 70
End: 2025-03-15

## 2025-04-26 ENCOUNTER — HEALTH MAINTENANCE LETTER (OUTPATIENT)
Age: 70
End: 2025-04-26